# Patient Record
Sex: MALE | Race: WHITE | NOT HISPANIC OR LATINO | Employment: FULL TIME | URBAN - NONMETROPOLITAN AREA
[De-identification: names, ages, dates, MRNs, and addresses within clinical notes are randomized per-mention and may not be internally consistent; named-entity substitution may affect disease eponyms.]

---

## 2024-05-19 ENCOUNTER — HOSPITAL ENCOUNTER (EMERGENCY)
Facility: HOSPITAL | Age: 24
Discharge: HOME | End: 2024-05-19
Payer: COMMERCIAL

## 2024-05-19 ENCOUNTER — APPOINTMENT (OUTPATIENT)
Dept: RADIOLOGY | Facility: HOSPITAL | Age: 24
End: 2024-05-19
Payer: COMMERCIAL

## 2024-05-19 VITALS
TEMPERATURE: 99.1 F | HEART RATE: 72 BPM | SYSTOLIC BLOOD PRESSURE: 147 MMHG | OXYGEN SATURATION: 98 % | WEIGHT: 185 LBS | RESPIRATION RATE: 16 BRPM | BODY MASS INDEX: 24.52 KG/M2 | DIASTOLIC BLOOD PRESSURE: 90 MMHG | HEIGHT: 73 IN

## 2024-05-19 DIAGNOSIS — S82.891A CLOSED FRACTURE OF RIGHT ANKLE, INITIAL ENCOUNTER: Primary | ICD-10-CM

## 2024-05-19 PROCEDURE — 99284 EMERGENCY DEPT VISIT MOD MDM: CPT

## 2024-05-19 PROCEDURE — 73590 X-RAY EXAM OF LOWER LEG: CPT | Mod: RT

## 2024-05-19 PROCEDURE — 73610 X-RAY EXAM OF ANKLE: CPT | Mod: RIGHT SIDE | Performed by: RADIOLOGY

## 2024-05-19 PROCEDURE — 73610 X-RAY EXAM OF ANKLE: CPT | Mod: RT

## 2024-05-19 PROCEDURE — 73590 X-RAY EXAM OF LOWER LEG: CPT | Mod: RIGHT SIDE | Performed by: RADIOLOGY

## 2024-05-19 PROCEDURE — 2500000001 HC RX 250 WO HCPCS SELF ADMINISTERED DRUGS (ALT 637 FOR MEDICARE OP): Performed by: PHYSICIAN ASSISTANT

## 2024-05-19 RX ORDER — IBUPROFEN 600 MG/1
600 TABLET ORAL ONCE
Status: COMPLETED | OUTPATIENT
Start: 2024-05-19 | End: 2024-05-19

## 2024-05-19 RX ORDER — HYDROCODONE BITARTRATE AND ACETAMINOPHEN 5; 325 MG/1; MG/1
1 TABLET ORAL EVERY 6 HOURS PRN
Qty: 12 TABLET | Refills: 0 | Status: SHIPPED | OUTPATIENT
Start: 2024-05-19 | End: 2024-05-22

## 2024-05-19 RX ADMIN — IBUPROFEN 600 MG: 600 TABLET, FILM COATED ORAL at 10:47

## 2024-05-19 ASSESSMENT — PAIN - FUNCTIONAL ASSESSMENT
PAIN_FUNCTIONAL_ASSESSMENT: 0-10
PAIN_FUNCTIONAL_ASSESSMENT: 0-10

## 2024-05-19 ASSESSMENT — COLUMBIA-SUICIDE SEVERITY RATING SCALE - C-SSRS
6. HAVE YOU EVER DONE ANYTHING, STARTED TO DO ANYTHING, OR PREPARED TO DO ANYTHING TO END YOUR LIFE?: NO
1. IN THE PAST MONTH, HAVE YOU WISHED YOU WERE DEAD OR WISHED YOU COULD GO TO SLEEP AND NOT WAKE UP?: NO
2. HAVE YOU ACTUALLY HAD ANY THOUGHTS OF KILLING YOURSELF?: NO

## 2024-05-19 ASSESSMENT — PAIN DESCRIPTION - ORIENTATION
ORIENTATION: RIGHT
ORIENTATION: RIGHT

## 2024-05-19 ASSESSMENT — PAIN DESCRIPTION - DESCRIPTORS
DESCRIPTORS: TIGHTNESS
DESCRIPTORS: OTHER (COMMENT)

## 2024-05-19 ASSESSMENT — PAIN SCALES - GENERAL
PAINLEVEL_OUTOF10: 5 - MODERATE PAIN
PAINLEVEL_OUTOF10: 5 - MODERATE PAIN
PAINLEVEL_OUTOF10: 4
PAINLEVEL_OUTOF10: 5 - MODERATE PAIN

## 2024-05-19 ASSESSMENT — PAIN DESCRIPTION - FREQUENCY: FREQUENCY: CONSTANT/CONTINUOUS

## 2024-05-19 ASSESSMENT — PAIN DESCRIPTION - LOCATION
LOCATION: ANKLE
LOCATION: LEG

## 2024-05-19 ASSESSMENT — PAIN DESCRIPTION - ONSET: ONSET: SUDDEN

## 2024-05-19 ASSESSMENT — PAIN DESCRIPTION - PAIN TYPE: TYPE: ACUTE PAIN

## 2024-05-19 NOTE — ED PROVIDER NOTES
HPI   Chief Complaint   Patient presents with    Leg Pain     Pt comes in for leg pain x yesterday. Pt states that he was in a golf cart yesterday and took a turn really hard causing him to fall out of the cart. Pt reports his entire right leg in pain. Swelling and bruising noted to the right ankle. PMS intact       Patient presents with right ankle pain after injury yesterday.  Patient states he is not exactly sure the mechanism of injury.  States he was in a golf cart that turned sharply and he fell out.  He was unable to continue golfing.  He has not been able to put any or minimal weight on the leg.  He has noticed significant swelling and ecchymosis.  Denies any loss of sensation or weakness.  Pain is worse with attempted movement or ambulation.  He does get some relief at rest.  Patient has been using ice packs with some improvement.  Patient denies any other injury from his fall.  Denies any head injury or loss conscious.  Denies any upper extremity injuries.  Patient states he does have an abrasion to the knee but he is having no trouble moving it.  Denies any pain with movement.      History provided by:  Patient                      [place] Coma Scale Score: 15                     Patient History   No past medical history on file.  Past Surgical History:   Procedure Laterality Date    OTHER SURGICAL HISTORY  12/07/2021    Tonsillectomy with adenoidectomy    OTHER SURGICAL HISTORY  12/07/2021    Achilles tendon surgery    OTHER SURGICAL HISTORY  12/07/2021    Foot surgery     No family history on file.  Social History     Tobacco Use    Smoking status: Not on file    Smokeless tobacco: Not on file   Substance Use Topics    Alcohol use: Not on file    Drug use: Not on file       Physical Exam   ED Triage Vitals [05/19/24 0947]   Temperature Heart Rate Respirations BP   37.3 °C (99.1 °F) 88 16 128/86      Pulse Ox Temp Source Heart Rate Source Patient Position   98 % Temporal Monitor Sitting      BP Location  FiO2 (%)     Right arm --       Physical Exam  Vitals and nursing note reviewed.   Constitutional:       General: He is not in acute distress.     Appearance: Normal appearance. He is normal weight. He is not ill-appearing or toxic-appearing.   HENT:      Head: Normocephalic and atraumatic.      Right Ear: External ear normal.      Left Ear: External ear normal.      Nose: Nose normal.      Mouth/Throat:      Mouth: Mucous membranes are moist.   Eyes:      General: No scleral icterus.     Conjunctiva/sclera: Conjunctivae normal.   Musculoskeletal:      Right hip: Normal.      Right knee: Normal.      Right ankle: Swelling and ecchymosis present. No deformity. Tenderness present over the lateral malleolus, medial malleolus and proximal fibula. No base of 5th metatarsal tenderness. Decreased range of motion. Normal pulse.      Right Achilles Tendon: No tenderness or defects. Chacon's test negative.        Legs:    Skin:     General: Skin is warm.      Capillary Refill: Capillary refill takes less than 2 seconds.      Findings: Bruising present. No rash.   Neurological:      General: No focal deficit present.      Mental Status: He is alert and oriented to person, place, and time.      Cranial Nerves: No cranial nerve deficit or facial asymmetry.      Sensory: No sensory deficit.      Motor: No weakness.   Psychiatric:         Attention and Perception: Attention and perception normal.         Mood and Affect: Mood and affect normal.         Speech: Speech normal.         Behavior: Behavior normal. Behavior is cooperative.         Thought Content: Thought content normal.         Cognition and Memory: Cognition and memory normal.         Judgment: Judgment normal.         ED Course & MDM   Diagnoses as of 05/19/24 1114   Closed fracture of right ankle, initial encounter       Medical Decision Making  Patient presents with right ankle pain after injury yesterday.  Patient states he is not exactly sure the mechanism of  injury.  States he was in a golf cart that turned sharply and he fell out.  He was unable to continue golfing.  He has not been able to put any or minimal weight on the leg.  He has noticed significant swelling and ecchymosis.  Denies any loss of sensation or weakness.  Pain is worse with attempted movement or ambulation.  He does get some relief at rest.  Patient has been using ice packs with some improvement.  Patient denies any other injury from his fall.  Denies any head injury or loss conscious.  Denies any upper extremity injuries.  Patient states he does have an abrasion to the knee but he is having no trouble moving it.  Denies any pain with movement.    Ddx: Fracture, contusion, strain, sprain, other    Will obtain x-rays.  Ibuprofen given for pain per patient request.  X-ray shows posterior malleolar fracture.  There is also widening ankle mortise.  Concerning for delta ligamentous injury  Patient placed in walking boot with crutches  Patient given strict instructions not to put any weight on the ankle or foot until further directed.  He is given numerous follow-up locally to give him opportunity to be seen soon as possible to discuss care and possible surgical intervention.  Patient is given podiatry as well as to local orthopedic specialist.  Patient encouraged to continue Tylenol Motrin for pain.  He is given a short course of Norco for additional pain relief if needed.  Patient is encouraged to continue ice elevate and rest.  Patient discharged home in improved and stable condition    Amount and/or Complexity of Data Reviewed  Radiology: ordered and independent interpretation performed. Decision-making details documented in ED Course.     Details: Fracture noted    Risk  Risk Details: None        Procedure  Procedures     Alexa Romero PA-C  05/19/24 4712

## 2024-05-19 NOTE — Clinical Note
Manuel Al was seen and treated in our emergency department on 5/19/2024.  He may return to work on 05/22/2024.  Patient may return sooner if improved.  Patient will be unable to use the right leg.  Please accommodate.     If you have any questions or concerns, please don't hesitate to call.      Alexa Romero PA-C

## 2024-05-19 NOTE — DISCHARGE INSTRUCTIONS
Follow-up with any other specialist listed above as soon as you can appointment.  You can continue Tylenol and Motrin for pain relief.  Pain medication as needed.  Continue to ice elevate and rest.  Do not put any weight on your ankle.  Use your crutches.  Stay in the boot until further directed.  You may remove the boot to wash your leg.

## 2024-05-20 ENCOUNTER — PREP FOR PROCEDURE (OUTPATIENT)
Dept: PODIATRY | Facility: HOSPITAL | Age: 24
End: 2024-05-20

## 2024-05-20 ENCOUNTER — LAB (OUTPATIENT)
Dept: LAB | Facility: LAB | Age: 24
End: 2024-05-20
Payer: COMMERCIAL

## 2024-05-20 DIAGNOSIS — S82.871A CLOSED DISPLACED PILON FRACTURE OF RIGHT TIBIA, INITIAL ENCOUNTER: Primary | ICD-10-CM

## 2024-05-20 DIAGNOSIS — S92.301A FRACTURE OF UNSPECIFIED METATARSAL BONE(S), RIGHT FOOT, INITIAL ENCOUNTER FOR CLOSED FRACTURE: Primary | ICD-10-CM

## 2024-05-20 LAB
ALBUMIN SERPL BCP-MCNC: 4.6 G/DL (ref 3.4–5)
ALP SERPL-CCNC: 90 U/L (ref 33–120)
ALT SERPL W P-5'-P-CCNC: 12 U/L (ref 10–52)
ANION GAP SERPL CALC-SCNC: 11 MMOL/L (ref 10–20)
AST SERPL W P-5'-P-CCNC: 19 U/L (ref 9–39)
BASOPHILS # BLD AUTO: 0.06 X10*3/UL (ref 0–0.1)
BASOPHILS NFR BLD AUTO: 0.6 %
BILIRUB SERPL-MCNC: 0.6 MG/DL (ref 0–1.2)
BUN SERPL-MCNC: 13 MG/DL (ref 6–23)
CALCIUM SERPL-MCNC: 9.5 MG/DL (ref 8.6–10.3)
CHLORIDE SERPL-SCNC: 103 MMOL/L (ref 98–107)
CO2 SERPL-SCNC: 29 MMOL/L (ref 21–32)
CREAT SERPL-MCNC: 1.05 MG/DL (ref 0.5–1.3)
EGFRCR SERPLBLD CKD-EPI 2021: >90 ML/MIN/1.73M*2
EOSINOPHIL # BLD AUTO: 0.12 X10*3/UL (ref 0–0.7)
EOSINOPHIL NFR BLD AUTO: 1.2 %
ERYTHROCYTE [DISTWIDTH] IN BLOOD BY AUTOMATED COUNT: 13.2 % (ref 11.5–14.5)
GLUCOSE SERPL-MCNC: 87 MG/DL (ref 74–99)
HCT VFR BLD AUTO: 49.6 % (ref 41–52)
HGB BLD-MCNC: 15.9 G/DL (ref 13.5–17.5)
IMM GRANULOCYTES # BLD AUTO: 0.06 X10*3/UL (ref 0–0.7)
IMM GRANULOCYTES NFR BLD AUTO: 0.6 % (ref 0–0.9)
LYMPHOCYTES # BLD AUTO: 2.33 X10*3/UL (ref 1.2–4.8)
LYMPHOCYTES NFR BLD AUTO: 23.4 %
MCH RBC QN AUTO: 30.8 PG (ref 26–34)
MCHC RBC AUTO-ENTMCNC: 32.1 G/DL (ref 32–36)
MCV RBC AUTO: 96 FL (ref 80–100)
MONOCYTES # BLD AUTO: 1.1 X10*3/UL (ref 0.1–1)
MONOCYTES NFR BLD AUTO: 11.1 %
NEUTROPHILS # BLD AUTO: 6.27 X10*3/UL (ref 1.2–7.7)
NEUTROPHILS NFR BLD AUTO: 63.1 %
NRBC BLD-RTO: 0 /100 WBCS (ref 0–0)
PLATELET # BLD AUTO: 196 X10*3/UL (ref 150–450)
POTASSIUM SERPL-SCNC: 3.9 MMOL/L (ref 3.5–5.3)
PROT SERPL-MCNC: 6.9 G/DL (ref 6.4–8.2)
RBC # BLD AUTO: 5.16 X10*6/UL (ref 4.5–5.9)
SODIUM SERPL-SCNC: 139 MMOL/L (ref 136–145)
WBC # BLD AUTO: 9.9 X10*3/UL (ref 4.4–11.3)

## 2024-05-20 PROCEDURE — 36415 COLL VENOUS BLD VENIPUNCTURE: CPT

## 2024-05-20 PROCEDURE — 85025 COMPLETE CBC W/AUTO DIFF WBC: CPT

## 2024-05-20 PROCEDURE — 80053 COMPREHEN METABOLIC PANEL: CPT

## 2024-05-23 ENCOUNTER — OFFICE VISIT (OUTPATIENT)
Dept: PRIMARY CARE | Facility: CLINIC | Age: 24
End: 2024-05-23
Payer: COMMERCIAL

## 2024-05-23 VITALS
WEIGHT: 185 LBS | DIASTOLIC BLOOD PRESSURE: 70 MMHG | HEIGHT: 73 IN | BODY MASS INDEX: 24.52 KG/M2 | HEART RATE: 82 BPM | OXYGEN SATURATION: 98 % | SYSTOLIC BLOOD PRESSURE: 122 MMHG

## 2024-05-23 DIAGNOSIS — Z01.818 PRE-OP EVALUATION: ICD-10-CM

## 2024-05-23 PROBLEM — D48.5 NEOPLASM OF UNCERTAIN BEHAVIOR OF SKIN: Status: ACTIVE | Noted: 2024-05-23

## 2024-05-23 PROBLEM — F41.1 GENERALIZED ANXIETY DISORDER: Status: ACTIVE | Noted: 2024-05-23

## 2024-05-23 PROCEDURE — 1036F TOBACCO NON-USER: CPT | Performed by: NURSE PRACTITIONER

## 2024-05-23 PROCEDURE — 99213 OFFICE O/P EST LOW 20 MIN: CPT | Performed by: NURSE PRACTITIONER

## 2024-05-23 PROCEDURE — 93000 ELECTROCARDIOGRAM COMPLETE: CPT | Performed by: NURSE PRACTITIONER

## 2024-05-23 ASSESSMENT — PATIENT HEALTH QUESTIONNAIRE - PHQ9
SUM OF ALL RESPONSES TO PHQ9 QUESTIONS 1 AND 2: 0
2. FEELING DOWN, DEPRESSED OR HOPELESS: NOT AT ALL
1. LITTLE INTEREST OR PLEASURE IN DOING THINGS: NOT AT ALL

## 2024-05-23 ASSESSMENT — ENCOUNTER SYMPTOMS
HEADACHES: 0
COUGH: 0
DIZZINESS: 0
SHORTNESS OF BREATH: 0

## 2024-05-23 NOTE — H&P (VIEW-ONLY)
"Subjective   Patient ID: Manuel Al is a 23 y.o. male who presents for Pre-op Exam (Foot fracture, surgery set for  ).    Manuel comes to the office, or preoperative clearance.  Is scheduled for an ORIF of the right ankle on May 30th under general/regional anesthesia.  He denies dizziness, chest pain, headache, or syncopal episodes.  IO EK bpm, normal sinus rhythm with sinus arrhythmia.  Labs completed through podiatrist and reviewed today  Medications: Taking Vicodin for pain.  Is not taking any multivitamins/herbs.  Instructed to avoid anti-inflammatories 7 days prior to scheduled surgery.  Patient endorses smoking (vaping) and marijuana use.  Patient has been encouraged to cut back and try to quit smoking and using marijuana prior to surgery as this may cause delayed wound healing.  No loose teeth or dental work in progress, prior tonsillectomy.  No snoring.  Has had general anesthesia in the past and tolerated without incident.  No family history of anesthesia complications or MH.  I, Debby Beltre, RUBEN-CNP, have examined this patient, checked all the appropriate lab work and tests and certify that, to the best of my knowledge, that there is no medical contraindication for undergoing surgery.            Review of Systems   Respiratory:  Negative for cough and shortness of breath.    Cardiovascular:  Negative for chest pain.   Musculoskeletal:         Right lower leg in boot   Neurological:  Negative for dizziness, syncope and headaches.       Objective   /70   Pulse 82   Ht 1.854 m (6' 1\")   Wt 83.9 kg (185 lb)   SpO2 98%   BMI 24.41 kg/m²     Physical Exam  Vitals and nursing note reviewed.   Constitutional:       Appearance: Normal appearance.   HENT:      Head: Normocephalic.      Mouth/Throat:      Pharynx: No posterior oropharyngeal erythema.      Comments: Both tonsils absent  Neck:      Thyroid: No thyromegaly.      Vascular: No carotid bruit.   Cardiovascular:      Rate and " Rhythm: Normal rate and regular rhythm.      Heart sounds: Normal heart sounds.   Pulmonary:      Effort: Pulmonary effort is normal.      Breath sounds: Normal breath sounds.   Musculoskeletal:      Cervical back: Normal range of motion.   Lymphadenopathy:      Cervical: No cervical adenopathy.   Skin:     General: Skin is warm and dry.   Neurological:      General: No focal deficit present.      Mental Status: He is alert and oriented to person, place, and time.   Psychiatric:         Mood and Affect: Mood normal.         Thought Content: Thought content normal.         Assessment/Plan   Problem List Items Addressed This Visit             ICD-10-CM    Pre-op evaluation Z01.818     1. Pre-op evaluation      IO EKG: normal

## 2024-05-24 NOTE — PREPROCEDURE INSTRUCTIONS
No outpatient medications have been marked as taking for the 5/30/24 encounter (Hospital Encounter).                       NPO Instructions:    Nothing to eat or drink after midnight    Additional Instructions:     Will need  home, will receive call day before surgery with arrival time

## 2024-05-29 ENCOUNTER — ANESTHESIA EVENT (OUTPATIENT)
Dept: OPERATING ROOM | Facility: HOSPITAL | Age: 24
End: 2024-05-29
Payer: COMMERCIAL

## 2024-05-29 ENCOUNTER — PREP FOR PROCEDURE (OUTPATIENT)
Dept: PODIATRY | Facility: HOSPITAL | Age: 24
End: 2024-05-29
Payer: COMMERCIAL

## 2024-05-29 DIAGNOSIS — S82.391A OTHER CLOSED FRACTURE OF DISTAL END OF RIGHT TIBIA, INITIAL ENCOUNTER: Primary | ICD-10-CM

## 2024-05-29 RX ORDER — SODIUM CHLORIDE, SODIUM LACTATE, POTASSIUM CHLORIDE, CALCIUM CHLORIDE 600; 310; 30; 20 MG/100ML; MG/100ML; MG/100ML; MG/100ML
75 INJECTION, SOLUTION INTRAVENOUS CONTINUOUS
Status: CANCELLED | OUTPATIENT
Start: 2024-05-30

## 2024-05-30 ENCOUNTER — APPOINTMENT (OUTPATIENT)
Dept: RADIOLOGY | Facility: HOSPITAL | Age: 24
End: 2024-05-30
Payer: COMMERCIAL

## 2024-05-30 ENCOUNTER — HOSPITAL ENCOUNTER (OUTPATIENT)
Facility: HOSPITAL | Age: 24
Setting detail: OUTPATIENT SURGERY
Discharge: HOME | End: 2024-05-30
Attending: PODIATRIST | Admitting: PODIATRIST
Payer: COMMERCIAL

## 2024-05-30 ENCOUNTER — ANESTHESIA (OUTPATIENT)
Dept: OPERATING ROOM | Facility: HOSPITAL | Age: 24
End: 2024-05-30
Payer: COMMERCIAL

## 2024-05-30 VITALS
SYSTOLIC BLOOD PRESSURE: 139 MMHG | DIASTOLIC BLOOD PRESSURE: 81 MMHG | TEMPERATURE: 97.4 F | WEIGHT: 186.73 LBS | HEART RATE: 56 BPM | OXYGEN SATURATION: 99 % | HEIGHT: 73 IN | BODY MASS INDEX: 24.75 KG/M2 | RESPIRATION RATE: 12 BRPM

## 2024-05-30 DIAGNOSIS — S82.391A OTHER CLOSED FRACTURE OF DISTAL END OF RIGHT TIBIA, INITIAL ENCOUNTER: ICD-10-CM

## 2024-05-30 PROCEDURE — C1713 ANCHOR/SCREW BN/BN,TIS/BN: HCPCS | Performed by: PODIATRIST

## 2024-05-30 PROCEDURE — 7100000002 HC RECOVERY ROOM TIME - EACH INCREMENTAL 1 MINUTE: Performed by: PODIATRIST

## 2024-05-30 PROCEDURE — 7100000001 HC RECOVERY ROOM TIME - INITIAL BASE CHARGE: Performed by: PODIATRIST

## 2024-05-30 PROCEDURE — 2500000005 HC RX 250 GENERAL PHARMACY W/O HCPCS: Performed by: ANESTHESIOLOGY

## 2024-05-30 PROCEDURE — 3700000001 HC GENERAL ANESTHESIA TIME - INITIAL BASE CHARGE: Performed by: PODIATRIST

## 2024-05-30 PROCEDURE — 2780000003 HC OR 278 NO HCPCS: Performed by: PODIATRIST

## 2024-05-30 PROCEDURE — 3600000004 HC OR TIME - INITIAL BASE CHARGE - PROCEDURE LEVEL FOUR: Performed by: PODIATRIST

## 2024-05-30 PROCEDURE — 3700000002 HC GENERAL ANESTHESIA TIME - EACH INCREMENTAL 1 MINUTE: Performed by: PODIATRIST

## 2024-05-30 PROCEDURE — 2500000004 HC RX 250 GENERAL PHARMACY W/ HCPCS (ALT 636 FOR OP/ED): Mod: JZ | Performed by: PODIATRIST

## 2024-05-30 PROCEDURE — 2500000004 HC RX 250 GENERAL PHARMACY W/ HCPCS (ALT 636 FOR OP/ED): Performed by: ANESTHESIOLOGY

## 2024-05-30 PROCEDURE — 2500000001 HC RX 250 WO HCPCS SELF ADMINISTERED DRUGS (ALT 637 FOR MEDICARE OP): Performed by: ANESTHESIOLOGY

## 2024-05-30 PROCEDURE — 7100000010 HC PHASE TWO TIME - EACH INCREMENTAL 1 MINUTE: Performed by: PODIATRIST

## 2024-05-30 PROCEDURE — 2720000007 HC OR 272 NO HCPCS: Performed by: PODIATRIST

## 2024-05-30 PROCEDURE — 7100000009 HC PHASE TWO TIME - INITIAL BASE CHARGE: Performed by: PODIATRIST

## 2024-05-30 PROCEDURE — 3600000009 HC OR TIME - EACH INCREMENTAL 1 MINUTE - PROCEDURE LEVEL FOUR: Performed by: PODIATRIST

## 2024-05-30 DEVICE — IMPLANTABLE DEVICE: Type: IMPLANTABLE DEVICE | Site: ANKLE | Status: NON-FUNCTIONAL

## 2024-05-30 DEVICE — IMPLANTABLE DEVICE: Type: IMPLANTABLE DEVICE | Site: ANKLE | Status: FUNCTIONAL

## 2024-05-30 RX ORDER — ONDANSETRON HYDROCHLORIDE 2 MG/ML
4 INJECTION, SOLUTION INTRAVENOUS ONCE
Status: COMPLETED | OUTPATIENT
Start: 2024-05-30 | End: 2024-05-30

## 2024-05-30 RX ORDER — SODIUM CHLORIDE, SODIUM LACTATE, POTASSIUM CHLORIDE, CALCIUM CHLORIDE 600; 310; 30; 20 MG/100ML; MG/100ML; MG/100ML; MG/100ML
75 INJECTION, SOLUTION INTRAVENOUS CONTINUOUS
Status: DISCONTINUED | OUTPATIENT
Start: 2024-05-30 | End: 2024-05-30 | Stop reason: HOSPADM

## 2024-05-30 RX ORDER — PROPOFOL 10 MG/ML
INJECTION, EMULSION INTRAVENOUS AS NEEDED
Status: DISCONTINUED | OUTPATIENT
Start: 2024-05-30 | End: 2024-05-30

## 2024-05-30 RX ORDER — ONDANSETRON HYDROCHLORIDE 2 MG/ML
4 INJECTION, SOLUTION INTRAVENOUS ONCE AS NEEDED
Status: DISCONTINUED | OUTPATIENT
Start: 2024-05-30 | End: 2024-05-30 | Stop reason: HOSPADM

## 2024-05-30 RX ORDER — ACETAMINOPHEN 325 MG/1
975 TABLET ORAL ONCE
Status: COMPLETED | OUTPATIENT
Start: 2024-05-30 | End: 2024-05-30

## 2024-05-30 RX ORDER — CEFAZOLIN SODIUM 2 G/100ML
2 INJECTION, SOLUTION INTRAVENOUS
Status: COMPLETED | OUTPATIENT
Start: 2024-05-30 | End: 2024-05-30

## 2024-05-30 RX ORDER — FAMOTIDINE 10 MG/ML
20 INJECTION INTRAVENOUS ONCE
Status: COMPLETED | OUTPATIENT
Start: 2024-05-30 | End: 2024-05-30

## 2024-05-30 RX ORDER — MIDAZOLAM HYDROCHLORIDE 1 MG/ML
2 INJECTION INTRAMUSCULAR; INTRAVENOUS ONCE
Status: COMPLETED | OUTPATIENT
Start: 2024-05-30 | End: 2024-05-30

## 2024-05-30 RX ORDER — HYDROCODONE BITARTRATE AND ACETAMINOPHEN 5; 325 MG/1; MG/1
TABLET ORAL
COMMUNITY

## 2024-05-30 RX ORDER — GLYCOPYRROLATE 0.2 MG/ML
INJECTION INTRAMUSCULAR; INTRAVENOUS AS NEEDED
Status: DISCONTINUED | OUTPATIENT
Start: 2024-05-30 | End: 2024-05-30

## 2024-05-30 RX ORDER — MIDAZOLAM HYDROCHLORIDE 1 MG/ML
1 INJECTION INTRAMUSCULAR; INTRAVENOUS ONCE
Status: COMPLETED | OUTPATIENT
Start: 2024-05-30 | End: 2024-05-30

## 2024-05-30 RX ORDER — SODIUM CHLORIDE, SODIUM LACTATE, POTASSIUM CHLORIDE, CALCIUM CHLORIDE 600; 310; 30; 20 MG/100ML; MG/100ML; MG/100ML; MG/100ML
100 INJECTION, SOLUTION INTRAVENOUS CONTINUOUS
Status: DISCONTINUED | OUTPATIENT
Start: 2024-05-30 | End: 2024-05-30 | Stop reason: HOSPADM

## 2024-05-30 RX ORDER — ROCURONIUM BROMIDE 10 MG/ML
INJECTION, SOLUTION INTRAVENOUS AS NEEDED
Status: DISCONTINUED | OUTPATIENT
Start: 2024-05-30 | End: 2024-05-30

## 2024-05-30 RX ORDER — FENTANYL CITRATE 50 UG/ML
INJECTION, SOLUTION INTRAMUSCULAR; INTRAVENOUS AS NEEDED
Status: DISCONTINUED | OUTPATIENT
Start: 2024-05-30 | End: 2024-05-30

## 2024-05-30 RX ORDER — GABAPENTIN 300 MG/1
300 CAPSULE ORAL ONCE
Status: COMPLETED | OUTPATIENT
Start: 2024-05-30 | End: 2024-05-30

## 2024-05-30 RX ORDER — KETOROLAC TROMETHAMINE 30 MG/ML
INJECTION, SOLUTION INTRAMUSCULAR; INTRAVENOUS AS NEEDED
Status: DISCONTINUED | OUTPATIENT
Start: 2024-05-30 | End: 2024-05-30

## 2024-05-30 RX ORDER — MIDAZOLAM HYDROCHLORIDE 1 MG/ML
INJECTION INTRAMUSCULAR; INTRAVENOUS AS NEEDED
Status: DISCONTINUED | OUTPATIENT
Start: 2024-05-30 | End: 2024-05-30

## 2024-05-30 RX ORDER — OXYCODONE HYDROCHLORIDE 5 MG/1
5 TABLET ORAL EVERY 4 HOURS PRN
Status: DISCONTINUED | OUTPATIENT
Start: 2024-05-30 | End: 2024-05-30 | Stop reason: HOSPADM

## 2024-05-30 RX ORDER — NEOSTIGMINE METHYLSULFATE 1 MG/ML
INJECTION, SOLUTION INTRAVENOUS AS NEEDED
Status: DISCONTINUED | OUTPATIENT
Start: 2024-05-30 | End: 2024-05-30

## 2024-05-30 RX ORDER — BUPIVACAINE HYDROCHLORIDE 2.5 MG/ML
INJECTION, SOLUTION EPIDURAL; INFILTRATION; INTRACAUDAL AS NEEDED
Status: DISCONTINUED | OUTPATIENT
Start: 2024-05-30 | End: 2024-05-30

## 2024-05-30 RX ADMIN — BUPIVACAINE HYDROCHLORIDE 45 ML: 2.5 INJECTION, SOLUTION EPIDURAL; INFILTRATION; INTRACAUDAL; PERINEURAL at 07:16

## 2024-05-30 RX ADMIN — FAMOTIDINE 20 MG: 10 INJECTION, SOLUTION INTRAVENOUS at 06:41

## 2024-05-30 RX ADMIN — CEFAZOLIN SODIUM 2 G: 2 INJECTION, SOLUTION INTRAVENOUS at 07:26

## 2024-05-30 RX ADMIN — GABAPENTIN 300 MG: 300 CAPSULE ORAL at 06:41

## 2024-05-30 RX ADMIN — MIDAZOLAM HYDROCHLORIDE 2 MG: 1 INJECTION, SOLUTION INTRAMUSCULAR; INTRAVENOUS at 07:07

## 2024-05-30 RX ADMIN — NEOSTIGMINE METHYLSULFATE 3 MG: 1 INJECTION, SOLUTION INTRAVENOUS at 08:55

## 2024-05-30 RX ADMIN — Medication 30 MG: at 07:31

## 2024-05-30 RX ADMIN — DEXAMETHASONE SODIUM PHOSPHATE 8 MG: 4 INJECTION, SOLUTION INTRAMUSCULAR; INTRAVENOUS at 07:50

## 2024-05-30 RX ADMIN — SODIUM CHLORIDE, POTASSIUM CHLORIDE, SODIUM LACTATE AND CALCIUM CHLORIDE 75 ML/HR: 600; 310; 30; 20 INJECTION, SOLUTION INTRAVENOUS at 06:41

## 2024-05-30 RX ADMIN — ACETAMINOPHEN 975 MG: 325 TABLET ORAL at 06:41

## 2024-05-30 RX ADMIN — ONDANSETRON 4 MG: 2 INJECTION INTRAMUSCULAR; INTRAVENOUS at 06:41

## 2024-05-30 RX ADMIN — MIDAZOLAM HYDROCHLORIDE 1 MG: 1 INJECTION, SOLUTION INTRAMUSCULAR; INTRAVENOUS at 07:20

## 2024-05-30 RX ADMIN — MIDAZOLAM HYDROCHLORIDE 2 MG: 1 INJECTION, SOLUTION INTRAMUSCULAR; INTRAVENOUS at 07:16

## 2024-05-30 RX ADMIN — PROPOFOL 200 MG: 10 INJECTION, EMULSION INTRAVENOUS at 07:31

## 2024-05-30 RX ADMIN — FENTANYL CITRATE 100 MCG: 50 INJECTION, SOLUTION INTRAMUSCULAR; INTRAVENOUS at 07:16

## 2024-05-30 RX ADMIN — KETOROLAC TROMETHAMINE 30 MG: 30 INJECTION, SOLUTION INTRAMUSCULAR at 08:45

## 2024-05-30 RX ADMIN — ROCURONIUM BROMIDE 40 MG: 10 INJECTION, SOLUTION INTRAVENOUS at 07:31

## 2024-05-30 RX ADMIN — GLYCOPYRROLATE 0.6 MG: 0.2 INJECTION, SOLUTION INTRAMUSCULAR; INTRAVENOUS at 08:55

## 2024-05-30 ASSESSMENT — PAIN SCALES - GENERAL
PAINLEVEL_OUTOF10: 0 - NO PAIN
PAIN_LEVEL: 1
PAINLEVEL_OUTOF10: 0 - NO PAIN

## 2024-05-30 ASSESSMENT — COLUMBIA-SUICIDE SEVERITY RATING SCALE - C-SSRS
2. HAVE YOU ACTUALLY HAD ANY THOUGHTS OF KILLING YOURSELF?: NO
1. IN THE PAST MONTH, HAVE YOU WISHED YOU WERE DEAD OR WISHED YOU COULD GO TO SLEEP AND NOT WAKE UP?: NO
6. HAVE YOU EVER DONE ANYTHING, STARTED TO DO ANYTHING, OR PREPARED TO DO ANYTHING TO END YOUR LIFE?: NO

## 2024-05-30 ASSESSMENT — PAIN - FUNCTIONAL ASSESSMENT
PAIN_FUNCTIONAL_ASSESSMENT: 0-10

## 2024-05-30 NOTE — ANESTHESIA PROCEDURE NOTES
Airway  Date/Time: 5/30/2024 7:31 AM  Urgency: elective      Staffing  Performed: attending   Authorized by: Benny Yan MD    Performed by: Benny Yan MD  Patient location during procedure: OR    Indications and Patient Condition  Preoxygenated: yes      Final Airway Details  Final airway type: endotracheal airway      Successful airway: ETT  Cuffed: yes   Successful intubation technique: video laryngoscopy  ETT size (mm): 8.0  Cormack-Lehane Classification: grade I - full view of glottis  Placement verified by: capnometry   Number of attempts at approach: 1

## 2024-05-30 NOTE — DISCHARGE INSTRUCTIONS
"Remain non weightbearing surgical limb.  Keep dressing and splint clean, dry, intact until follow up visit at [facility] next week.   Apply ice to back of knee for 15 min each hour if needed for pain control. Do not apply ice pack directly to foot or ankle.   Take pain medication as prescribed in a safe manner only if needed.   Elevate surgical limb.   Call if you have questions or concerns; [phone].    If appropriate alternate acetaminophen/Tylenol with ibuprofen every 3 hours to help with pain control. For example: if you take Tylenol at 12:00 follow with ibuprofen at 3:00, Tylenol again at 6:00 and ibuprofen at 9:00 an so forth.    If unable to take ibuprofen, Tylenol can be taken every 4 hours for pain control.      Surgery to fix a broken bone - Discharge instructions    The Basics  Written by the doctors and editors at [facility]  What are discharge instructions? -- Discharge instructions are information about how to take care of yourself after getting medical care for a health problem.  How are broken bones fixed with surgery? -- This can be done with something called \"open reduction and internal fixation\" surgery. For this surgery, the doctor moves the broken pieces of bone into the correct place. This is called \"reduction.\" They also use some type of hardware to hold the bone in place while it heals. This is called \"internal fixation.\" The hardware can include metal plates, rods or nails, pins, wires, or screws (figure 1).  Usually, the hardware used to hold the bone in place goes inside of the body. It can stay in place after the bone heals or, in some cases, the doctor will remove it. For some kinds of broken bones, the doctor uses pins or screws that go through the skin and into the bone. They are attached to a metal bar that is outside of the body. This is called an \"external fixator.\" It is removed when the bone is healed.  How long it takes for you to recover, and what you need to do, " "depends on what kind of fracture you had. Most fractures take weeks to months to heal. Fractures in children usually heal faster than fractures in adults.  How do I care for myself at home? -- Ask the doctor or nurse what you should do when you go home. Make sure that you understand exactly what you need to do to care for yourself. Ask questions if there is anything you do not understand.  For the first 24 hours after surgery:  ? Do not drive or operate heavy or dangerous machinery.  ? Do not make any important decisions or sign any important papers.  ? Do not drink alcohol of any kind.  You should also:  ? Prop your injured body part on pillows, keeping it above the level of your heart. This can help lessen pain and swelling.  ? Take medicine like ibuprofen (sample brand names: Advil, Motrin) or naproxen (sample brand names: Aleve, Naprosyn) for swelling and pain, if needed. These are nonsteroidal antiinflammatory drugs (\"NSAIDS\"). You might also have gotten a prescription for stronger pain medicines to take for a short time. If so, follow the instructions for taking them.  ? Take a stool softener to help with constipation, if needed. Opioid pain medicines can cause constipation.  ? Ice to help ease pain and swelling - Place an ice pack or a bag of frozen vegetables wrapped in a towel over the painful part. Never put ice right on the skin. Do not leave the ice on for more than 10 to 15 minutes at a time. Use for the first 24 to 48 hours after an injury.  ? If you smoke, try to quit. Broken bones take longer to heal if you smoke.  ? Take care of your incision - You might have stitches, skin staples, surgical glue, or a special skin tape on your incision. If you had laparoscopic surgery, you might have more than 1 incision.  ? If you do not have a cast or splint over your incision:  ? Keep your incision dry and covered with a bandage for the first 1 to 2 days after surgery. Your doctor or nurse will tell you exactly " how long to keep your incision dry.  ? Once you no longer need to keep your incision dry, gently wash it with soap and water whenever you take a shower. Do not put your incision underwater, such as in a bath, pool, or lake. This can slow healing and raise your chance of getting an infection.  ? After you wash your incision, pat it dry. Your doctor or nurse will tell you if you need to put an antibiotic ointment on the incision. They will also tell you if you need to cover your incision with a bandage or gauze.  ? Always wash your hands before and after you touch your incision or bandage.  ? If you have a cast or splint that covers your incision:  ? Keep your cast or splint dry. The doctor will tell you when this needs to be changed.  ? If you have an external fixator:  ? The staff will teach you how to take care of the pins in your skin.  ? Increase your activity slowly - Start with short walks around your home, and walk a little more each day.  ? Use your sling, crutches, walker, or wheelchair as instructed. If your surgery was on your pelvis, leg, ankle, or foot, you might not be able to put weight on it as you walk.  ? Avoid heavy lifting, sports, and swimming until for at least a week or 2. (Your doctor or nurse will tell you exactly how long to avoid these or other activities.)  ? Your doctor might refer you to a physical therapist (exercise expert). If you were given specific exercises, be sure to do them. This will help build strength once your bone has healed.  What follow-up care do I need? -- The doctor will want to see you again after surgery to check on your progress. Go to these appointments.  If you have stitches or staples, you will need to have them taken out. Your doctor will usually want to do this in 1 to 2 weeks. Some stitches absorb on their own and do not need to be removed. If the doctor used skin glue or tape, it will fall off on its own. Do not pick at it or try to remove it yourself.  If  you have a cast or splint, the doctor will want to remove or change it at a certain time. Do not try to remove it yourself.  When should I call the doctor? -- Call for emergency help right away (in the [place] and [place], call 9-1-1) if:  ? You start to have trouble breathing.  ? You are becoming pale and very weak or pass out.  Call for advice if:  ? You have a fever of 100.4°F (38°C) or higher, or chills.  ? There is a bad smell or drainage coming from your cast, splint, or incision.  ? You have worsening redness or swelling around the cuts from your surgery.  ? You have severe pain that is not relieved by pain medicine.  ? Your cast or splint becomes too tight and uncomfortable.  ? Your fingers or toes are blue, gray, or cold.  ? Your cast or splint feels too loose, or your cast has a crack or becomes soft.  ? You are not able to move your fingers or toes.  ? The skin becomes red or irritated around the cast.  All topics are updated as new evidence becomes available and our peer review process is complete.  This topic retrieved from [employer] on: Mar 13, 2024.  Topic 741997 Version 2.0  Release: 32.2.4 - C32.71  © 2024 UpToDate, Inc. and/or its affiliates. All rights reserved.  figure 1: Open reduction and internal fixation    Consumer Information Use and Disclaimer  Disclaimer: This generalized information is a limited summary of diagnosis, treatment, and/or medication information. It is not meant to be comprehensive and should be used as a tool to help the user understand and/or assess potential diagnostic and treatment options. It does NOT include all information about conditions, treatments, medications, side effects, or risks that may apply to a specific patient. It is not intended to be medical advice or a substitute for the medical advice, diagnosis, or treatment of a health care provider based on the health care provider's examination and assessment of a patient's specific and unique circumstances. Patients  must speak with a health care provider for complete information about their health, medical questions, and treatment options, including any risks or benefits regarding use of medications. This information does not endorse any treatments or medications as safe, effective, or approved for treating a specific patient. UpToDate, Inc. and its affiliates disclaim any warranty or liability relating to this information or the use thereof.The use of this information is governed by the Terms of Use, available at https://www.woltersSwiveluwer.com/en/know/clinical-effectiveness-terms. 2024© UpToDate, Inc. and its affiliates and/or licensors. All rights reserved.  © 2024 UpToDate, Inc. and/or its affiliates. All rights reserved.

## 2024-05-30 NOTE — ANESTHESIA PREPROCEDURE EVALUATION
Patient: Manuel Al    Procedure Information       Date/Time: 05/30/24 0730    Procedure: Open Reduction Internal Fixation right  Ankle fracture with syndesmosis repair (Right: Ankle) - 90 min. large c arm, lateral dec vs supine position    Location: [facility] OR 03 / [facility] OR    Surgeons: Julita Estrada DPM            Relevant Problems   Neuro   (+) Generalized anxiety disorder       Clinical information reviewed:   Tobacco  Allergies  Meds   Med Hx  Surg Hx   Fam Hx  Soc Hx        NPO Detail:  NPO/Void Status  Carbohydrate Drink Given Prior to Surgery? : N  Date of Last Liquid: 05/29/24  Time of Last Liquid: 2200  Date of Last Solid: 05/29/24  Time of Last Solid: 2200  Last Intake Type: Clear fluids  Time of Last Void: 0500         Physical Exam    Airway  Mallampati: II  TM distance: >3 FB  Neck ROM: full     Cardiovascular   Rhythm: regular     Dental    Pulmonary    Abdominal        Anesthesia Plan    History of general anesthesia?: yes  History of complications of general anesthesia?: no    ASA 2     general and regional     intravenous induction   Anesthetic plan and risks discussed with patient.

## 2024-05-30 NOTE — BRIEF OP NOTE
Date: 2024  OR Location: [facility] OR    Name: Manuel Al, : 2000, Age: 23 y.o., MRN: [id number], Sex: male    Diagnosis  Pre-op Diagnosis     * Closed displaced pilon fracture of right tibia, initial encounter [X01.079J] Post-op Diagnosis     * Closed displaced pilon fracture of right tibia, initial encounter [E79.369B]     Procedures  Open Reduction Internal Fixation right  Ankle fracture with syndesmosis repair  18795 - AL OPEN TREATMENT POSTERIOR MALLEOLUS FRACTURE      Surgeons      * Julita Estrada - Primary    Resident/Fellow/Other Assistant:  Assistant - Juan    Procedure Summary  Anesthesia: General  ASA: II  Anesthesia Staff: Anesthesiologist: Benny Yan MD  Estimated Blood Loss: 25 mL  Intra-op Medications: Administrations occurring from 0730 to 0920 on 24:  * No intraprocedure medications in log *           Anesthesia Record               Intraprocedure I/O Totals          Intake    Neostigmine 0.00 mL    The total shown is the total volume documented since Anesthesia Start was filed.    Total Intake 0 mL          Specimen: No specimens collected     Staff:   Circulator: Lalito Davisub Person: Sarahi  Circulator: Dari  Scrub Person: Antonio    Findings: hemostasis controlled. Fracture and ankle reduction performed. See detailed operation report    Complications:  None; patient tolerated the procedure well.     Disposition: PACU - hemodynamically stable.  Condition: stable  Specimens Collected: No specimens collected  Attending Attestation: I was present and scrubbed for the entire procedure.    Julita Estrada  Phone Number: [phone]

## 2024-05-30 NOTE — ANESTHESIA PROCEDURE NOTES
Peripheral Block    Patient location during procedure: pre-op  Reason for block: at surgeon's request  Staffing  Performed: attending   Authorized by: Benny Yan MD    Performed by: Benny Yan MD  Preanesthetic Checklist  Completed: patient identified, IV checked, site marked, risks and benefits discussed, surgical consent, monitors and equipment checked, pre-op evaluation and timeout performed   Timeout performed at:   Peripheral Block  Patient position: laying flat  Prep: ChloraPrep  Patient monitoring: heart rate, cardiac monitor and continuous pulse ox  Block type: popliteal  Laterality: right  Injection technique: single-shot  Guidance: ultrasound guided  Local infiltration: lidocaine  Needle  Needle type: short-bevel   Needle gauge: 22 G  Needle localization: nerve stimulator and ultrasound guidance  Test dose: negative  Assessment  Injection assessment: negative aspiration for heme and no paresthesia on injection  Heart rate change: no  Slow fractionated injection: yes

## 2024-05-30 NOTE — ANESTHESIA POSTPROCEDURE EVALUATION
Patient: Manuel Al    Procedure Summary       Date: 05/30/24 Room / Location: [facility] OR 03 / [facility]    Anesthesia Start: 0733 Anesthesia Stop: 0909    Procedure: Open Reduction Internal Fixation right  Ankle fracture with syndesmosis repair (Right: Ankle) Diagnosis:       Closed displaced pilon fracture of right tibia, initial encounter      (Closed displaced pilon fracture of right tibia, initial encounter [S82.879A])    Surgeons: Julita Estrada DPM Responsible Provider: Benny Yan MD    Anesthesia Type: general, regional ASA Status: 2            Anesthesia Type: general, regional    Vitals Value Taken Time   Vitals:    05/30/24 0623   BP: (!) 144/92   Pulse: 72   Resp: 16   Temp: 36.8 °C (98.2 °F)   SpO2: 97%       05/30/24 0909     05/30/24 0909     05/30/24 0909     05/30/24 0909     05/30/24 0909       Anesthesia Post Evaluation    Patient location during evaluation: bedside  Patient participation: complete - patient participated  Level of consciousness: sleepy but conscious  Pain score: 1  Pain management: adequate  Airway patency: patent  Cardiovascular status: acceptable  Respiratory status: acceptable  Hydration status: acceptable  Postoperative Nausea and Vomiting: none      No notable events documented.

## 2024-05-30 NOTE — OP NOTE
Open Reduction Internal Fixation right  Ankle fracture with syndesmosis repair (R) Operative Note     Date: 2024  OR Location: [facility] OR    Name: Manuel Al, : 2000, Age: 23 y.o., MRN: [id number], Sex: male    Diagnosis  Pre-op Diagnosis     * Closed displaced pilon fracture of right tibia, initial encounter [X24.556Z] Post-op Diagnosis     * Closed displaced pilon fracture of right tibia, initial encounter [F06.190B]     Procedures  Open Reduction Internal Fixation right  Ankle fracture with syndesmosis repair  [id number] - MI OPEN TREATMENT POSTERIOR MALLEOLUS FRACTURE      Surgeons      * Julita Estrada - Primary    Resident/Fellow/Other Assistant:  Assistant - Juan    Procedure Summary  Anesthesia: General  ASA: II  Anesthesia Staff: Anesthesiologist: Benny Yan MD  Estimated Blood Loss: 25mL  Intra-op Medications: Administrations occurring from 0730 to 0920 on 24:  * No intraprocedure medications in log *           Anesthesia Record               Intraprocedure I/O Totals          Intake    Neostigmine 0.00 mL    The total shown is the total volume documented since Anesthesia Start was filed.    Total Intake 0 mL          Specimen: No specimens collected     Staff:   Circulator: Lalito Davisub Person: Sarahi  Circulator: Dari Davisub Person: Antonio    Drains and/or Catheters: none    Tourniquet Times:     Total Tourniquet Time Documented:  Thigh (Right) - 43 minutes  Total: Thigh (Right) - 43 minutes      Implants: 3.5 zahra screws x 2    Findings: hemostasis controlled. Fracture and ankle reduced. See details below.    Indications: Manuel Al is an 23 y.o. male who is having surgery for Closed displaced pilon fracture of right tibia, initial encounter [S82.874U]. He has no significant past medical history . He fell out of a golf cart while he took a sharp right turn on 24. The mechanism of injury was unknown but he heard a pop and was unable to bear weight. He denies other  injuries or loss of consciousness. He had x-rays initially obtained while in the ER. His xrays demonstrate fracture the posterior tibial including about 25-30% of the articular surface with some medial gutter widening. The tibia fibula overlap appears maintained on the ankle ap view. No other acute fractures or dislocations of the ankle or foot (right) are noted. He has intact neurovascular status in clinic and pain to palpate the fracture region. No additional pain to palpation is noted to the proximal fibula, distal fibula, hindfoot or midfoot.     The preoperative indications, benefits, risk, complications, and anticipated healing time and management were reviewed in detail with the patient.  Preoperative optimization, clearance, and all diagnostic data were reviewed in detail.  The patient elects to proceed forward at this time.  Patient understands risks and complications include but are not limited to the following: Failed hardware, infection, delayed or nonhealing, need for further surgery, blood clot, allergic reaction, chronic pain syndrome, use of long-term assistive device, swelling, scar, over or under correction, nerve injury with resultant pain or numbness, recurrence, postoperative arthritis, loss of function, loss of limb, loss of life.  The surgical consent and limb were be signed.  I answered all the patient's questions to their satisfaction.     The patient was seen in the preoperative area. The risks, benefits, complications, treatment options, non-operative alternatives, expected recovery and outcomes were discussed with the patient. The possibilities of reaction to medication, pulmonary aspiration, injury to surrounding structures, bleeding, recurrent infection, the need for additional procedures, failure to diagnose a condition, and creating a complication requiring transfusion or operation were discussed with the patient. The patient concurred with the proposed plan, giving informed consent.   The site of surgery was properly noted/marked if necessary per policy. The patient has been actively warmed in preoperative area. Preoperative antibiotics have been ordered and given within 1 hours of incision. Venous thrombosis prophylaxis have been ordered including unilateral sequential compression device    Procedure in detail:    The patient was transported to the operating room via cart and placed on the operating room table in the prone position.  Final verification of the patient, surgery, and limb designation was performed via the timeout procedure.  The anesthesia team initiated planned anesthesia.  Preoperative regional popliteal block was provided by anesthia physician.   A well-padded pneumatic right thigh tourniquet was placed on the surgical limb.  The surgical limb was prepped and draped in the usual aseptic manner.  Surgery began in the following manner:     An Esmarch bandage was used to exsanguinate the limb and the tourniquet was inflated at this time.  A linear 8 cm incision was made to the posterior lateral ankle posterior to the fibular border.  This incision made through the skin.  Blunt dissection was performed down to the posterior crural fascia taking care to identify, protect, and retract all neurovascular structures at this time and throughout the remainder of the surgery.  A rent was made into this posterior fascial compartment and the peroneal tendons and flexor hallucis longus tendon were clearly identified.  The peroneal tendons were retracted anteriorly and flexor hallucis longus muscle belly medial so the posterior fracture fragment was identified directly.  Sharp resection of the periosteum was performed only at the fracture fragment site to allow mobilization.  This was temporarily fixated with guidewires and utilizing proper AO fixation technique, two cannulated partially threaded 3.5 screws were applied to allow compression and full fracture reduction.  Improvement of the  articular alignment was achieved to an anatomic position without gapping or step op.  Proper hardware placement including trajectory and placement was confirmed with intraoperative fluoroscopy.  The joint surfaces are well aligned and the fibula maintained the desired length.  The syndesmosis was next stressed with a cotton test, stress eversion and dorsiflexion external rotation.  No instability was noted.  Therefore additional fixation was not deemed necessary.  Copious saline irrigation was performed.  Deep closure was achieved with Vicryl.  The tourniquet was deflated at this time and pressure was applied to maintain hemostasis with minimal electrocauterization was required.  No pulsatile bleeding was noted.  There was brisk capillary refill time to all digits of the right foot at this time.  The skin was reapproximated with 3-0 nylon suture using horizontal mattress technique. A dressing consisting of Adaptic soaked in Betadine, gauze, abdominal pads, and Kerlix was applied.  Lastly, A well-padded posterior mold was next applied with the right lower extremity in a rectus position.     After procedure:     The patient tolerated the procedure and anesthesia well.  The patient was transported to the recovery area with vital signs stable and vascular status intact to the surgical limb.  Electronic orders were placed.  To ice elevate for pain and inflammation management.  Pain medication plan in place; to take in a safe manner as prescribed.   Clarified weightbearing status; non weightbear right lower extremity with splint intact.  Keep dressing and splint clean, dry, and intact until follow up next week at [facility].   The patient will further be transferred home upon continued stability.     Complications:  None; patient tolerated the procedure well.    Disposition: PACU - hemodynamically stable.  Condition: stable     Attending Attestation: I was present and scrubbed for the entire  procedure.    Julita Estrada DPM FACFAS  [facility]

## 2024-06-21 ENCOUNTER — HOSPITAL ENCOUNTER (OUTPATIENT)
Dept: RADIOLOGY | Facility: CLINIC | Age: 24
Discharge: HOME | End: 2024-06-21
Payer: COMMERCIAL

## 2024-06-21 DIAGNOSIS — M25.571 PAIN IN RIGHT ANKLE AND JOINTS OF RIGHT FOOT: ICD-10-CM

## 2024-06-21 PROCEDURE — 73610 X-RAY EXAM OF ANKLE: CPT | Mod: RT

## 2024-07-19 ENCOUNTER — HOSPITAL ENCOUNTER (OUTPATIENT)
Dept: RADIOLOGY | Facility: CLINIC | Age: 24
Discharge: HOME | End: 2024-07-19
Payer: COMMERCIAL

## 2024-07-19 DIAGNOSIS — M25.571 PAIN IN RIGHT ANKLE AND JOINTS OF RIGHT FOOT: ICD-10-CM

## 2024-07-19 PROCEDURE — 73610 X-RAY EXAM OF ANKLE: CPT | Mod: RT

## 2024-07-25 ENCOUNTER — EVALUATION (OUTPATIENT)
Dept: PHYSICAL THERAPY | Facility: CLINIC | Age: 24
End: 2024-07-25
Payer: COMMERCIAL

## 2024-07-25 DIAGNOSIS — R26.9 GAIT ABNORMALITY: ICD-10-CM

## 2024-07-25 DIAGNOSIS — S82.899A ANKLE FRACTURE: Primary | ICD-10-CM

## 2024-07-25 PROCEDURE — 97110 THERAPEUTIC EXERCISES: CPT | Mod: GP

## 2024-07-25 PROCEDURE — 97161 PT EVAL LOW COMPLEX 20 MIN: CPT | Mod: GP

## 2024-07-25 ASSESSMENT — PAIN - FUNCTIONAL ASSESSMENT: PAIN_FUNCTIONAL_ASSESSMENT: 0-10

## 2024-07-25 ASSESSMENT — PAIN SCALES - GENERAL: PAINLEVEL_OUTOF10: 0 - NO PAIN

## 2024-07-25 ASSESSMENT — ENCOUNTER SYMPTOMS
LOSS OF SENSATION IN FEET: 0
DEPRESSION: 0
OCCASIONAL FEELINGS OF UNSTEADINESS: 0

## 2024-07-25 ASSESSMENT — PAIN DESCRIPTION - DESCRIPTORS: DESCRIPTORS: ACHING

## 2024-07-25 NOTE — PROGRESS NOTES
Physical Therapy    Physical Therapy Evaluation    Patient Name: Manuel Al  MRN: [id number]  : 2000  Referring Physician: Julita Estrada  Today's Date: 2024  Time Calculation  Start Time: 1230  Stop Time: 1308  Time Calculation (min): 38 min  PT Evaluation Time Entry  PT Evaluation (Low) Time Entry: 26  PT Therapeutic Procedures Time Entry  Therapeutic Exercise Time Entry: 12           General  Reason for Referral: R ankle fx  Referred By: Julita Estrada  General Comment: Visit  POC    Current Problem  Problem List Items Addressed This Visit             ICD-10-CM       Musculoskeletal and Injuries    Ankle fracture - Primary S82.899A    Relevant Orders    Follow Up In Physical Therapy       Symptoms and Signs    Gait abnormality R26.9          SUBJECTIVE  Subjective   Patient reports broken R distal tibia that began on 24 with surgical fixation on 24 . This is leading to difficulty with walking, weightbearing, stairs, squatting, running, jumping, and return to active lifestyle.  Pain is reported to range 0-5/10 with daily activities.  Patient states that their goal is to regain function with physical therapy intervention.    Prior level of function: WNL    Patient's name and  were confirmed this date.    Pt broke R tibia on  with ORIF fixation on . Pt currently presents in walking boot. Pt was told on  to start at 50% wbing then to work up to 100% but pt has been able to ambulate with 100% in walking boot with just some noted discomfort. 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 Pt reports some N+T in R heel. Pt notices increases in pain with increased weightbearing activity.     Precautions  Precautions  STEADI Fall Risk Score (The score of 4 or more indicates an increased risk of falling): 0  Precautions Comment: Prior foot surgeries It is ok to progress from partial to full weightbearing as tolerated in boot and then  "transition to shoe over the next month per surgeon        Pain  Pain Assessment: 0-10  0-10 (Numeric) Pain Score: 0 - No pain (At worst a 4-5 with increased wbing)  Pain Type: Surgical pain  Pain Location: Ankle  Pain Orientation: Right  Pain Descriptors: Aching  Pain Frequency: Intermittent    Barriers to Participation: None    OBJECTIVE:    Lower Extremity Strength: (5/5 unless noted)  MMT RIGHT LEFT   Hip Flexion     Hip Extension     Hip Abduction     Hip Adduction     Knee Extension     Knee Flexion     Ankle DF 5 5   Ankle PF 4 5   Ankle INV 4 5   Ankle EV 4 5     Lower Extremity ROM: WNL unless documented below:  ROM in Degrees  RIGHT LEFT   Hip Flexion     Hip Extension     Hip Abduction     Hip Adduction     Knee Extension     Knee Flexion      Ankle DF (20) 2 11   Ankle PF (30-50) 46 53   Ankle INV (20) 21 23   Ankle EV (10) 10 12     Joint mobility: Decreased talocural posterior glide on R  Gait mechanics: In walking boot, R lateral lean when R wbing  Palpation: No TTP   Neurological symptoms: NT  Muscle Flexibility: Nt  Special tests:  NT    Other Measures  Lower Extremity Funtional Score (LEFS): 55       TREATMENT:     4-way ankle with lime band x10  DF with strap assist 5\" hold x10  Weight Shifts side to side x10    PATIENT EDUCATION:  Access Code: [email or web address]  URL: [email or web address]  Date: 07/25/2024  Prepared by: Chuck Adams    Exercises  - Long Sitting Ankle Eversion with Resistance  - 1 x daily - 7 x weekly - 3 sets - 10 reps  - Long Sitting Ankle Inversion with Resistance  - 1 x daily - 7 x weekly - 3 sets - 10 reps  - Long Sitting Ankle Plantar Flexion with Resistance  - 1 x daily - 7 x weekly - 3 sets - 10 reps  - Long Sitting Ankle Dorsiflexion with Anchored Resistance  - 1 x daily - 7 x weekly - 3 sets - 10 reps  - Long Sitting Calf Stretch with Strap  - 1 x daily - 7 x weekly - 3 sets - 10 reps - 5s hold  - Side to Side Weight Shift with Counter Support  - 1 x daily - " 7 x weekly - 3 sets - 10 reps    ASSESSEMENT  Pt is a 23 y.o.  referred for physical therapy by Julita Estrada DPM  for R ankle fracture post ORIF. Pt presents with decreased ankle ROM, decreased ankle strength, gait abnormalities, and difficulty weightbearing that is affecting walking, stairs, squatting, jumping, jogging, and active lifestyle. This patient would benefit from a therapy program to restore prior level of function, decrease pain, increase AROM, and increase strength. Pt tolerated all treatment at this time.     Rehab potential: Good    Plan for next visit:  Review HEP, ankle strengthening, ankle ROM, BAPS board, progress wbing    PLAN  Treatment/Interventions: Aquatic therapy, Blood flow restriction therapy, Biofeedback, Canalith repositioning, Cryotherapy, Dry needling, Education/ Instruction, Electrical stimulation, Fluidotherapy, Gait training, Hot pack, Iontophoresis, Laser, Manual therapy, Mechanical traction, Neuromuscular re-education, Orthosis fit/ training, Self care/ home management, Taping techniques, Therapeutic activities, Therapeutic exercises, Ultrasound, Vasopneumatic device  PT Plan: Skilled PT  PT Frequency: 1 time per week  Duration: 8 weeks  Onset Date: 05/18/24  Rehab Potential: Good  Plan of Care Agreement: Patient    Pt. Is in agreement with PT plan.  Goals:  Active       PT Problem       PT R ankle Goals       Start:  07/25/24    Expected End:  10/03/24       1. Pt will adhere to and complete HEP in order to improve functionality outside of the clinic. (2 weeks)    2.   Pt will report 0/10 pain when performing all weight bearing activities in order to improve walking, stairs, running, jumping, and participating in an active lifestyle. (6-8 weeks)    3.   Pt will ambulate with normal gait pattern to improve energy efficiency and decrease fall risk. (6-8 weeks)    4.   Pt will increase strength of R ankle to  5/5 in order to improve weightbearing, walking, ability to complete  stairs, running, jumping, and to participate in an active lifestyle.     5.   Pt will improve ROM of R ankle DF to 10 degrees in order to improve gait mechanics, ability to complete stairs, squat, run, and have an active lifestyle. (5-6 weeks)    6.   Pt will complete stairs with reciprocal  pattern  to increase energy efficiency and promote functional mobility.     7.  Pt goal: Regain full ankle function     8. Pt will improve LEFS by 9 points (MCID) to show reduction in disability and improvement in functionality. (6-8 weeks)

## 2024-08-01 ENCOUNTER — TREATMENT (OUTPATIENT)
Dept: PHYSICAL THERAPY | Facility: CLINIC | Age: 24
End: 2024-08-01
Payer: COMMERCIAL

## 2024-08-01 DIAGNOSIS — S82.899A ANKLE FRACTURE: ICD-10-CM

## 2024-08-01 PROCEDURE — 97110 THERAPEUTIC EXERCISES: CPT | Mod: GP,CQ

## 2024-08-01 ASSESSMENT — PAIN - FUNCTIONAL ASSESSMENT: PAIN_FUNCTIONAL_ASSESSMENT: 0-10

## 2024-08-01 ASSESSMENT — PAIN SCALES - GENERAL: PAINLEVEL_OUTOF10: 0 - NO PAIN

## 2024-08-01 NOTE — PROGRESS NOTES
Physical Therapy Treatment    Patient Name: Manuel Al  MRN: [id number]  Today's Date: 8/1/2024  Time Calculation  Start Time: 1400  Stop Time: 1443  Time Calculation (min): 43 min  PT Therapeutic Procedures Time Entry  Therapeutic Exercise Time Entry: 41       Assessment:   Patient identified by name and date of birth. Patient ambulated back to treatment area with boot, he required cues to decrease with external hip/foot rotation added hip abd with cues for neutral foot/hip. He was able to progress with ROM w/o increased sx noted.     OBJECTIVE     Plan:  OP PT Plan  Treatment/Interventions: Aquatic therapy, Blood flow restriction therapy, Biofeedback, Canalith repositioning, Cryotherapy, Dry needling, Education/ Instruction, Electrical stimulation, Fluidotherapy, Gait training, Hot pack, Iontophoresis, Laser, Manual therapy, Mechanical traction, Neuromuscular re-education, Orthosis fit/ training, Self care/ home management, Taping techniques, Therapeutic activities, Therapeutic exercises, Ultrasound, Vasopneumatic device  PT Plan: Skilled PT  PT Frequency: 1 time per week  Duration: 8 weeks  Onset Date: 05/18/24  Rehab Potential: Good  Plan of Care Agreement: Patient  Continue with ROM and strengthening as tolerated for increased ease with ambulation.   Current Problem  Problem List Items Addressed This Visit             ICD-10-CM    Ankle fracture S82.899A       Subjective Patient reported compliance with % of the time and verbalized understanding.  Patient reported he is 100% wb with ambulation and only has pain if he std to long and it goes away if he rests.   General  Reason for Referral: R ankle fx  Referred By: Julita Estrada  General Comment: Visit 2/9 POC  Precautions  Precautions  Precautions Comment: Prior foot surgeries    Pain  Pain Assessment: 0-10  0-10 (Numeric) Pain Score: 0 - No pain     Treatments:  Therapeutic Exercise  Therapeutic Exercise Performed: Yes  Standing weight shifts fwd/lat  "x10 ea   Standing hip abd standing on L 2 x 10   Seated heel/toe raises 2 x 10   Seated Baps board x20   -DF/PF, IV/EV, CW,CCW   Towel towel crunch 2 x 1'   Seated DF strap stretch x10 10\" hold   Seated 4 way ankle lime band 2 x 10   ABC trace 1 cycle      OP EDUCATION:   [email or web address]  URL: [email or web address]  Date: 07/25/2024  Prepared by: Chuck Adams     Exercises  - Long Sitting Ankle Eversion with Resistance  - 1 x daily - 7 x weekly - 3 sets - 10 reps  - Long Sitting Ankle Inversion with Resistance  - 1 x daily - 7 x weekly - 3 sets - 10 reps  - Long Sitting Ankle Plantar Flexion with Resistance  - 1 x daily - 7 x weekly - 3 sets - 10 reps  - Long Sitting Ankle Dorsiflexion with Anchored Resistance  - 1 x daily - 7 x weekly - 3 sets - 10 reps  - Long Sitting Calf Stretch with Strap  - 1 x daily - 7 x weekly - 3 sets - 10 reps - 5s hold  - Side to Side Weight Shift with Counter Support  - 1 x daily - 7 x weekly - 3 sets - 10 reps    Goals:  Active       PT Problem       PT R ankle Goals       Start:  07/25/24    Expected End:  10/03/24       1. Pt will adhere to and complete HEP in order to improve functionality outside of the clinic. (2 weeks)    2.   Pt will report 0/10 pain when performing all weight bearing activities in order to improve walking, stairs, running, jumping, and participating in an active lifestyle. (6-8 weeks)    3.   Pt will ambulate with normal gait pattern to improve energy efficiency and decrease fall risk. (6-8 weeks)    4.   Pt will increase strength of R ankle to  5/5 in order to improve weightbearing, walking, ability to complete stairs, running, jumping, and to participate in an active lifestyle.     5.   Pt will improve ROM of R ankle DF to 10 degrees in order to improve gait mechanics, ability to complete stairs, squat, run, and have an active lifestyle. (5-6 weeks)    6.   Pt will complete stairs with reciprocal  pattern  to increase energy efficiency and " promote functional mobility.     7.  Pt goal: Regain full ankle function     8. Pt will improve LEFS by 9 points (MCID) to show reduction in disability and improvement in functionality. (6-8 weeks)

## 2024-08-09 ENCOUNTER — TREATMENT (OUTPATIENT)
Dept: PHYSICAL THERAPY | Facility: CLINIC | Age: 24
End: 2024-08-09
Payer: COMMERCIAL

## 2024-08-09 DIAGNOSIS — S82.899A ANKLE FRACTURE: ICD-10-CM

## 2024-08-09 PROCEDURE — 97110 THERAPEUTIC EXERCISES: CPT | Mod: GP,CQ

## 2024-08-09 ASSESSMENT — PAIN - FUNCTIONAL ASSESSMENT: PAIN_FUNCTIONAL_ASSESSMENT: 0-10

## 2024-08-09 ASSESSMENT — PAIN SCALES - GENERAL: PAINLEVEL_OUTOF10: 0 - NO PAIN

## 2024-08-09 NOTE — PROGRESS NOTES
Physical Therapy Treatment    Patient Name: Manuel Al  MRN: [id number]  Today's Date: 8/9/2024  Time Calculation  Start Time: 1315  Stop Time: 1357  Time Calculation (min): 42 min  PT Therapeutic Procedures Time Entry  Therapeutic Exercise Time Entry: 40       Assessment:   Patient able to tolerate session with mild challenges. Demo's fatigue in glutes with standing isometric PRE's but subsides quickly. Patient tolerates progressions of reps with no c/o increased symptoms. Demo's improvements pre and post session.     Plan:  Continue to work on progressing ROM and strength to be able to tolerate prolonged ambulation with little to no difficulty. -AB.     OP PT Plan  Treatment/Interventions: Aquatic therapy, Blood flow restriction therapy, Biofeedback, Canalith repositioning, Cryotherapy, Dry needling, Education/ Instruction, Electrical stimulation, Fluidotherapy, Gait training, Hot pack, Iontophoresis, Laser, Manual therapy, Mechanical traction, Neuromuscular re-education, Orthosis fit/ training, Self care/ home management, Taping techniques, Therapeutic activities, Therapeutic exercises, Ultrasound, Vasopneumatic device  PT Plan: Skilled PT  PT Frequency: 1 time per week  Duration: 8 weeks  Onset Date: 05/18/24  Rehab Potential: Good  Plan of Care Agreement: Patient    Current Problem  Problem List Items Addressed This Visit             ICD-10-CM    Ankle fracture S82.899A       Subjective   General  Reason for Referral: R ankle fx  Referred By: Julita Estrada  General Comment: Visit 3/9 POC  HEP: Yes  Patient states that he is doing his HEP every day. States that after he's done he really feels better about his foot.     Precautions  Precautions  Precautions Comment: Prior foot surgeries    Pain  Pain Assessment: 0-10  0-10 (Numeric) Pain Score: 0 - No pain    Objective     Treatments:  Therapeutic Exercise: 40 minutes, 3 units  SciFit x6' (N)  Standing weight shifts fwd/lat 2x10 ea (P, reps)   Standing hip abd  "standing on L 2 x 10   Seated heel/toe raises 2 x 10   Seated Baps board x20 lvl 2   -DF/PF, IV/EV, CW,CCW   Towel towel crunch 2 x 1'   Seated DF strap stretch x10 10\" hold   Seated 4 way ankle lime band 2 x 10   ABC trace 1 cycle     OP EDUCATION:   [email or web address]  URL: [email or web address]  Date: 07/25/2024  Prepared by: Chuck Adams     Exercises  - Long Sitting Ankle Eversion with Resistance  - 1 x daily - 7 x weekly - 3 sets - 10 reps  - Long Sitting Ankle Inversion with Resistance  - 1 x daily - 7 x weekly - 3 sets - 10 reps  - Long Sitting Ankle Plantar Flexion with Resistance  - 1 x daily - 7 x weekly - 3 sets - 10 reps  - Long Sitting Ankle Dorsiflexion with Anchored Resistance  - 1 x daily - 7 x weekly - 3 sets - 10 reps  - Long Sitting Calf Stretch with Strap  - 1 x daily - 7 x weekly - 3 sets - 10 reps - 5s hold  - Side to Side Weight Shift with Counter Support  - 1 x daily - 7 x weekly - 3 sets - 10 reps    Goals:  Active       PT Problem       PT R ankle Goals       Start:  07/25/24    Expected End:  10/03/24       1. Pt will adhere to and complete HEP in order to improve functionality outside of the clinic. (2 weeks)    2.   Pt will report 0/10 pain when performing all weight bearing activities in order to improve walking, stairs, running, jumping, and participating in an active lifestyle. (6-8 weeks)    3.   Pt will ambulate with normal gait pattern to improve energy efficiency and decrease fall risk. (6-8 weeks)    4.   Pt will increase strength of R ankle to  5/5 in order to improve weightbearing, walking, ability to complete stairs, running, jumping, and to participate in an active lifestyle.     5.   Pt will improve ROM of R ankle DF to 10 degrees in order to improve gait mechanics, ability to complete stairs, squat, run, and have an active lifestyle. (5-6 weeks)    6.   Pt will complete stairs with reciprocal  pattern  to increase energy efficiency and promote functional " mobility.     7.  Pt goal: Regain full ankle function     8. Pt will improve LEFS by 9 points (MCID) to show reduction in disability and improvement in functionality. (6-8 weeks)

## 2024-08-16 ENCOUNTER — TREATMENT (OUTPATIENT)
Dept: PHYSICAL THERAPY | Facility: CLINIC | Age: 24
End: 2024-08-16
Payer: COMMERCIAL

## 2024-08-16 ENCOUNTER — HOSPITAL ENCOUNTER (OUTPATIENT)
Dept: RADIOLOGY | Facility: CLINIC | Age: 24
Discharge: HOME | End: 2024-08-16
Payer: COMMERCIAL

## 2024-08-16 DIAGNOSIS — M25.571 PAIN IN RIGHT ANKLE AND JOINTS OF RIGHT FOOT: ICD-10-CM

## 2024-08-16 DIAGNOSIS — S82.899A ANKLE FRACTURE: ICD-10-CM

## 2024-08-16 PROCEDURE — 97110 THERAPEUTIC EXERCISES: CPT | Mod: GP,CQ

## 2024-08-16 PROCEDURE — 73610 X-RAY EXAM OF ANKLE: CPT | Mod: RT

## 2024-08-16 ASSESSMENT — PAIN SCALES - GENERAL: PAINLEVEL_OUTOF10: 1

## 2024-08-16 ASSESSMENT — PAIN - FUNCTIONAL ASSESSMENT: PAIN_FUNCTIONAL_ASSESSMENT: 0-10

## 2024-08-16 NOTE — PROGRESS NOTES
Physical Therapy Treatment    Patient Name: Manuel Al  MRN: [id number]  Today's Date: 8/16/2024  Time Calculation  Start Time: 1315  Stop Time: 1357  Time Calculation (min): 42 min  PT Therapeutic Procedures Time Entry  Therapeutic Exercise Time Entry: 40       Assessment:   Patient able to tolerate WB throughout session. Demo's good form throughout session, however does demo fatigue at end of session with mild soreness noted. HEP updated and reviewed with patient yeison'g good understanding.     Plan:  Continue to work on improving strength and ROM to be able to tolerate prolonged ambulation with little to no difficulty. -AB.     OP PT Plan  Treatment/Interventions: Aquatic therapy, Blood flow restriction therapy, Biofeedback, Canalith repositioning, Cryotherapy, Dry needling, Education/ Instruction, Electrical stimulation, Fluidotherapy, Gait training, Hot pack, Iontophoresis, Laser, Manual therapy, Mechanical traction, Neuromuscular re-education, Orthosis fit/ training, Self care/ home management, Taping techniques, Therapeutic activities, Therapeutic exercises, Ultrasound, Vasopneumatic device  PT Plan: Skilled PT  PT Frequency: 1 time per week  Duration: 8 weeks  Onset Date: 05/18/24  Rehab Potential: Good  Plan of Care Agreement: Patient    Current Problem  Problem List Items Addressed This Visit             ICD-10-CM    Ankle fracture S82.899A       Subjective   General  Reason for Referral: R ankle fx  Referred By: Julita Estrada  General Comment: Visit 4/9 POC  HEP: Yes  Patient states that he went and saw the dr this morning, and states that he is able to be in a shoe for 1 hour a day. States that everything is healing well. States that the dr is happy with how things are healing.     Precautions  Precautions  Precautions Comment: Prior foot surgeries    Pain  Pain Assessment: 0-10  0-10 (Numeric) Pain Score: 1  Pain Location: Ankle  Pain Orientation: Right    Objective     Treatments:  Therapeutic  "Exercise: 40 minutes, 3 units  SciFit x6' (N)  Slantboard 2x1' (N)  Step ups 4\" step x15 R Fwd/Lateral (N)  Standing heel raises x15 Bilat (N)  Standing weight shifts fwd/lat 2x10 ea (P, reps)   Standing hip abd standing on L 2 x 10   Seated heel/toe raises 2 x 10 (X)  Seated big toe extension 2x10 R (N)  Seated 4 toe extension 2x10 R (N)  Standing mini squats x15 (N)  Seated Baps board x20 lvl 2 (X)  -DF/PF, IV/EV, CW,CCW   Towel towel crunch 2 x 1'   Seated DF strap stretch x10 10\" hold (X)  Seated 4 way ankle lime band 2 x 10 (X)  ABC trace 1 cycle    OP EDUCATION:  Access Code: [email or web address]  URL: [email or web address]  Date: 08/16/2024  Prepared by: Idalia Blount    Exercises  - Seated Lesser Toes Extension  - 1 x daily - 7 x weekly - 3 sets - 10 reps  - Standing Heel Raises  - 1 x daily - 7 x weekly - 3 sets - 10 reps  - Step Up  - 1 x daily - 7 x weekly - 3 sets - 10 reps  - Seated Great Toe Extension  - 1 x daily - 7 x weekly - 3 sets - 10 reps     [email or web address]  URL: [email or web address]  Date: 07/25/2024  Prepared by: Chuck Adams     Exercises  - Long Sitting Ankle Eversion with Resistance  - 1 x daily - 7 x weekly - 3 sets - 10 reps  - Long Sitting Ankle Inversion with Resistance  - 1 x daily - 7 x weekly - 3 sets - 10 reps  - Long Sitting Ankle Plantar Flexion with Resistance  - 1 x daily - 7 x weekly - 3 sets - 10 reps  - Long Sitting Ankle Dorsiflexion with Anchored Resistance  - 1 x daily - 7 x weekly - 3 sets - 10 reps  - Long Sitting Calf Stretch with Strap  - 1 x daily - 7 x weekly - 3 sets - 10 reps - 5s hold  - Side to Side Weight Shift with Counter Support  - 1 x daily - 7 x weekly - 3 sets - 10 reps    Goals:  Active       PT Problem       PT R ankle Goals       Start:  07/25/24    Expected End:  10/03/24       1. Pt will adhere to and complete HEP in order to improve functionality outside of the clinic. (2 weeks)    2.   Pt will report 0/10 pain when " performing all weight bearing activities in order to improve walking, stairs, running, jumping, and participating in an active lifestyle. (6-8 weeks)    3.   Pt will ambulate with normal gait pattern to improve energy efficiency and decrease fall risk. (6-8 weeks)    4.   Pt will increase strength of R ankle to  5/5 in order to improve weightbearing, walking, ability to complete stairs, running, jumping, and to participate in an active lifestyle.     5.   Pt will improve ROM of R ankle DF to 10 degrees in order to improve gait mechanics, ability to complete stairs, squat, run, and have an active lifestyle. (5-6 weeks)    6.   Pt will complete stairs with reciprocal  pattern  to increase energy efficiency and promote functional mobility.     7.  Pt goal: Regain full ankle function     8. Pt will improve LEFS by 9 points (MCID) to show reduction in disability and improvement in functionality. (6-8 weeks)

## 2024-08-23 ENCOUNTER — TREATMENT (OUTPATIENT)
Dept: PHYSICAL THERAPY | Facility: CLINIC | Age: 24
End: 2024-08-23
Payer: COMMERCIAL

## 2024-08-23 DIAGNOSIS — S82.899A ANKLE FRACTURE: ICD-10-CM

## 2024-08-23 PROCEDURE — 97110 THERAPEUTIC EXERCISES: CPT | Mod: GP,CQ

## 2024-08-23 ASSESSMENT — PAIN SCALES - GENERAL: PAINLEVEL_OUTOF10: 0 - NO PAIN

## 2024-08-23 ASSESSMENT — PAIN - FUNCTIONAL ASSESSMENT: PAIN_FUNCTIONAL_ASSESSMENT: 0-10

## 2024-08-23 NOTE — PROGRESS NOTES
"Physical Therapy Treatment    Patient Name: Manuel Al  MRN: [id number]  Today's Date: 8/23/2024  Time Calculation  Start Time: 1315  Stop Time: 1359  Time Calculation (min): 44 min  PT Therapeutic Procedures Time Entry  Therapeutic Exercise Time Entry: 42       Assessment:   Patient appropriately challenged. Patient able to progress with proprioception and challenged with SLS this date. Patient demo's no c/o increased symptoms pre and post session.     Plan:  Continue to work on improving strength and ROM to be able to tolerate normal daily tasks with little to no challenges. -AB.     OP PT Plan  Treatment/Interventions: Aquatic therapy, Blood flow restriction therapy, Biofeedback, Canalith repositioning, Cryotherapy, Dry needling, Education/ Instruction, Electrical stimulation, Fluidotherapy, Gait training, Hot pack, Iontophoresis, Laser, Manual therapy, Mechanical traction, Neuromuscular re-education, Orthosis fit/ training, Self care/ home management, Taping techniques, Therapeutic activities, Therapeutic exercises, Ultrasound, Vasopneumatic device  PT Plan: Skilled PT  PT Frequency: 1 time per week  Duration: 8 weeks  Onset Date: 05/18/24  Rehab Potential: Good  Plan of Care Agreement: Patient    Current Problem  Problem List Items Addressed This Visit             ICD-10-CM    Ankle fracture S82.899A       Subjective   General  Reason for Referral: R ankle fx  Referred By: Julita Estrada  General Comment: Visit 5/9 POC  HEP: Yes  Patient states that he's not currently having pain.     Precautions  Precautions  Precautions Comment: Prior foot surgeries    Pain  Pain Assessment: 0-10  0-10 (Numeric) Pain Score: 0 - No pain  Pain Location: Ankle  Pain Orientation: Right    Objective     Treatments:  Therapeutic Exercise: 42 minutes, 3 units  Recumbent bike x6' (N)  SciFit x6' (X)  Slantboard 2x1'   Step ups 18\" step x15 R Fwd/Lateral (P, surface)  Sports cord x5 2 cords Fwd/Bkwd/Lateral (N)  Standing heel raises " "x15 Bilat (N)  SLS 3x30\" R (N)  Tandem ambulation 16' x2 (N)  Fwd lunge R leading x15 (N)  Squats x10 (N)    Not today: 8-23-24  Standing weight shifts fwd/lat 2x10 ea (X)  Standing hip abd standing Bilat 2 x 10   Seated heel/toe raises 2 x 10 (X)  Seated big toe extension 2x10 R (N)  Seated 4 toe extension 2x10 R (N)  Standing mini squats x15 (N)  Seated Baps board x20 lvl 2 (X)  -DF/PF, IV/EV, CW,CCW   Towel towel crunch 2 x 1'   Seated DF strap stretch x10 10\" hold (X)  Seated 4 way ankle lime band 2 x 10 (X)  ABC trace 1 cycle    OP EDUCATION:   Access Code: [email or web address]  URL: [email or web address]  Date: 08/16/2024  Prepared by: Idalia Blount    Exercises  - Seated Lesser Toes Extension  - 1 x daily - 7 x weekly - 3 sets - 10 reps  - Standing Heel Raises  - 1 x daily - 7 x weekly - 3 sets - 10 reps  - Step Up  - 1 x daily - 7 x weekly - 3 sets - 10 reps  - Seated Great Toe Extension  - 1 x daily - 7 x weekly - 3 sets - 10 reps     [email or web address]  URL: [email or web address]  Date: 07/25/2024  Prepared by: Chuck Adams     Exercises  - Long Sitting Ankle Eversion with Resistance  - 1 x daily - 7 x weekly - 3 sets - 10 reps  - Long Sitting Ankle Inversion with Resistance  - 1 x daily - 7 x weekly - 3 sets - 10 reps  - Long Sitting Ankle Plantar Flexion with Resistance  - 1 x daily - 7 x weekly - 3 sets - 10 reps  - Long Sitting Ankle Dorsiflexion with Anchored Resistance  - 1 x daily - 7 x weekly - 3 sets - 10 reps  - Long Sitting Calf Stretch with Strap  - 1 x daily - 7 x weekly - 3 sets - 10 reps - 5s hold  - Side to Side Weight Shift with Counter Support  - 1 x daily - 7 x weekly - 3 sets - 10 reps    Goals:  Active       PT Problem       PT R ankle Goals       Start:  07/25/24    Expected End:  10/03/24       1. Pt will adhere to and complete HEP in order to improve functionality outside of the clinic. (2 weeks)    2.   Pt will report 0/10 pain when performing all weight " bearing activities in order to improve walking, stairs, running, jumping, and participating in an active lifestyle. (6-8 weeks)    3.   Pt will ambulate with normal gait pattern to improve energy efficiency and decrease fall risk. (6-8 weeks)    4.   Pt will increase strength of R ankle to  5/5 in order to improve weightbearing, walking, ability to complete stairs, running, jumping, and to participate in an active lifestyle.     5.   Pt will improve ROM of R ankle DF to 10 degrees in order to improve gait mechanics, ability to complete stairs, squat, run, and have an active lifestyle. (5-6 weeks)    6.   Pt will complete stairs with reciprocal  pattern  to increase energy efficiency and promote functional mobility.     7.  Pt goal: Regain full ankle function     8. Pt will improve LEFS by 9 points (MCID) to show reduction in disability and improvement in functionality. (6-8 weeks)

## 2024-08-30 ENCOUNTER — TREATMENT (OUTPATIENT)
Dept: PHYSICAL THERAPY | Facility: CLINIC | Age: 24
End: 2024-08-30
Payer: COMMERCIAL

## 2024-08-30 DIAGNOSIS — S82.899A ANKLE FRACTURE: ICD-10-CM

## 2024-08-30 PROCEDURE — 97110 THERAPEUTIC EXERCISES: CPT | Mod: GP,CQ

## 2024-08-30 ASSESSMENT — PAIN SCALES - GENERAL: PAINLEVEL_OUTOF10: 1

## 2024-08-30 ASSESSMENT — PAIN - FUNCTIONAL ASSESSMENT: PAIN_FUNCTIONAL_ASSESSMENT: 0-10

## 2024-08-30 NOTE — PROGRESS NOTES
"Physical Therapy Treatment    Patient Name: Manuel Al  MRN: [id number]  Today's Date: 8/30/2024  Time Calculation  Start Time: 1312  Stop Time: 1357  Time Calculation (min): 45 min  PT Therapeutic Procedures Time Entry  Therapeutic Exercise Time Entry: 42       Assessment:   Patient able to tolerate new PRE's with no c/o increased symptoms. Patient challenged with proprioception with ankle sway noted. Patient tolerates progressions of reps.     Plan:  Continue to work on improving strength and decreasing pain to be able to perform normal work duties with little to no difficulty. -AB.     OP PT Plan  Treatment/Interventions: Aquatic therapy, Blood flow restriction therapy, Biofeedback, Canalith repositioning, Cryotherapy, Dry needling, Education/ Instruction, Electrical stimulation, Fluidotherapy, Gait training, Hot pack, Iontophoresis, Laser, Manual therapy, Mechanical traction, Neuromuscular re-education, Orthosis fit/ training, Self care/ home management, Taping techniques, Therapeutic activities, Therapeutic exercises, Ultrasound, Vasopneumatic device  PT Plan: Skilled PT  PT Frequency: 1 time per week  Duration: 8 weeks  Onset Date: 05/18/24  Rehab Potential: Good  Plan of Care Agreement: Patient    Current Problem  Problem List Items Addressed This Visit             ICD-10-CM    Ankle fracture S82.899A       Subjective   General  Reason for Referral: R ankle fx  Referred By: Julita Estrada  General Comment: Visit 6/9 POC  HEP: Yes  Patient states that he's not really having pain today. States that he thought that his hip felt off when he takes his foot out of the boot.     Precautions  Precautions  Precautions Comment: Prior foot surgeries    Pain  Pain Assessment: 0-10  0-10 (Numeric) Pain Score: 1  Pain Location: Ankle  Pain Orientation: Right    Objective     Treatments:  Therapeutic Exercise: 42 minutes, 3 units  Elliptical x5' (N)  Slantboard 2x1'   Step ups 18\" step 2x10 R Fwd/Lateral (P, reps)  Sports " "cord x5 2 cords Fwd/Bkwd/Lateral   Standing heel raises x15 Bilat   SLS 3x30\" R on airex (P, surface)   Tandem ambulation 16' x2   Fwd lunge R leading 2x10 (P, reps)   Squats x10   Small balance board 2x1' (N)  Extreme balance board 2x1' (N)    Not today: 8-23-24  Recumbent bike x6' (X)  SciFit x6' (X)  Standing weight shifts fwd/lat 2x10 ea (X)  Standing hip abd standing Bilat 2 x 10   Seated heel/toe raises 2 x 10 (X)  Seated big toe extension 2x10 R (N)  Seated 4 toe extension 2x10 R (N)  Standing mini squats x15 (N)  Seated Baps board x20 lvl 2 (X)  -DF/PF, IV/EV, CW,CCW   Towel towel crunch 2 x 1'   Seated DF strap stretch x10 10\" hold (X)  Seated 4 way ankle lime band 2 x 10 (X)  ABC trace 1 cycle    OP EDUCATION:   Access Code: [email or web address]  URL: [email or web address]  Date: 08/16/2024  Prepared by: Idalia Blount    Exercises  - Seated Lesser Toes Extension  - 1 x daily - 7 x weekly - 3 sets - 10 reps  - Standing Heel Raises  - 1 x daily - 7 x weekly - 3 sets - 10 reps  - Step Up  - 1 x daily - 7 x weekly - 3 sets - 10 reps  - Seated Great Toe Extension  - 1 x daily - 7 x weekly - 3 sets - 10 reps     [email or web address]  URL: [email or web address]  Date: 07/25/2024  Prepared by: Chuck Adams     Exercises  - Long Sitting Ankle Eversion with Resistance  - 1 x daily - 7 x weekly - 3 sets - 10 reps  - Long Sitting Ankle Inversion with Resistance  - 1 x daily - 7 x weekly - 3 sets - 10 reps  - Long Sitting Ankle Plantar Flexion with Resistance  - 1 x daily - 7 x weekly - 3 sets - 10 reps  - Long Sitting Ankle Dorsiflexion with Anchored Resistance  - 1 x daily - 7 x weekly - 3 sets - 10 reps  - Long Sitting Calf Stretch with Strap  - 1 x daily - 7 x weekly - 3 sets - 10 reps - 5s hold  - Side to Side Weight Shift with Counter Support  - 1 x daily - 7 x weekly - 3 sets - 10 reps    Goals:  Active       PT Problem       PT R ankle Goals       Start:  07/25/24    Expected End:  " 10/03/24       1. Pt will adhere to and complete HEP in order to improve functionality outside of the clinic. (2 weeks)    2.   Pt will report 0/10 pain when performing all weight bearing activities in order to improve walking, stairs, running, jumping, and participating in an active lifestyle. (6-8 weeks)    3.   Pt will ambulate with normal gait pattern to improve energy efficiency and decrease fall risk. (6-8 weeks)    4.   Pt will increase strength of R ankle to  5/5 in order to improve weightbearing, walking, ability to complete stairs, running, jumping, and to participate in an active lifestyle.     5.   Pt will improve ROM of R ankle DF to 10 degrees in order to improve gait mechanics, ability to complete stairs, squat, run, and have an active lifestyle. (5-6 weeks)    6.   Pt will complete stairs with reciprocal  pattern  to increase energy efficiency and promote functional mobility.     7.  Pt goal: Regain full ankle function     8. Pt will improve LEFS by 9 points (MCID) to show reduction in disability and improvement in functionality. (6-8 weeks)

## 2024-09-06 ENCOUNTER — TREATMENT (OUTPATIENT)
Dept: PHYSICAL THERAPY | Facility: CLINIC | Age: 24
End: 2024-09-06
Payer: COMMERCIAL

## 2024-09-06 DIAGNOSIS — S82.899A ANKLE FRACTURE: ICD-10-CM

## 2024-09-06 PROCEDURE — 97110 THERAPEUTIC EXERCISES: CPT | Mod: GP,CQ | Performed by: PHYSICAL THERAPY ASSISTANT

## 2024-09-06 ASSESSMENT — PAIN SCALES - GENERAL: PAINLEVEL_OUTOF10: 0 - NO PAIN

## 2024-09-06 ASSESSMENT — PAIN - FUNCTIONAL ASSESSMENT: PAIN_FUNCTIONAL_ASSESSMENT: 0-10

## 2024-09-06 NOTE — PROGRESS NOTES
Physical Therapy    Physical Therapy Treatment    Patient Name: Manuel Al  MRN: [id number]  Today's Date: 9/6/2024  Time Calculation  Start Time: 1230  Stop Time: 1315  Time Calculation (min): 45 min  PT Therapeutic Procedures Time Entry  Therapeutic Exercise Time Entry: 42                   Current Problem  Problem List Items Addressed This Visit             ICD-10-CM       Musculoskeletal and Injuries    Ankle fracture S82.899A        General  Reason for Referral: R ankle fx  Referred By: Julita Estrada  General Comment: VISIT 7/9 POC    Subjective    Patient reports no falls and states no pain today.  Precautions  Precautions  Precautions Comment: Prior foot surgeries    Pain  Pain Assessment: 0-10  0-10 (Numeric) Pain Score: 0 - No pain  Pain Location: Ankle  Pain Orientation: Right    Objective   Physical Therapy Treatment    Patient Name: Manuel Al  MRN: [id number]  Today's Date: 9/6/2024  Time Calculation  Start Time: 1230  Stop Time: 1315  Time Calculation (min): 45 min  PT Therapeutic Procedures Time Entry  Therapeutic Exercise Time Entry: 42       Assessment:   Patient able to tolerate new PRE's with no c/o increased symptoms. Patient challenged with proprioception with ankle sway noted. Patient tolerates progressions of reps.     Plan:  Continue to work on improving strength and decreasing pain to be able to perform normal work duties with little to no difficulty. -AB.     OP PT Plan  Treatment/Interventions: Aquatic therapy, Blood flow restriction therapy, Biofeedback, Canalith repositioning, Cryotherapy, Dry needling, Education/ Instruction, Electrical stimulation, Fluidotherapy, Gait training, Hot pack, Iontophoresis, Laser, Manual therapy, Mechanical traction, Neuromuscular re-education, Orthosis fit/ training, Self care/ home management, Taping techniques, Therapeutic activities, Therapeutic exercises, Ultrasound, Vasopneumatic device  PT Plan: Skilled PT  PT Frequency: 1 time per week  Duration:  "8 weeks  Onset Date: 05/18/24  Rehab Potential: Good  Plan of Care Agreement: Patient    Current Problem  Problem List Items Addressed This Visit             ICD-10-CM       Musculoskeletal and Injuries    Ankle fracture S82.899A       Subjective   General  Reason for Referral: R ankle fx  Referred By: Julita Estrada  General Comment: VISIT 7/9 POC  HEP: Yes  Patient states that he's not really having pain today. States that he thought that his hip felt off when he takes his foot out of the boot.     Precautions  Precautions  Precautions Comment: Prior foot surgeries    Pain  Pain Assessment: 0-10  0-10 (Numeric) Pain Score: 0 - No pain  Pain Location: Ankle  Pain Orientation: Right    Objective     Treatments:  Therapeutic Exercise: 42 minutes, 3 units  Elliptical x5'   Slantboard 2x1'   Step ups 18\" step 2x10 R Fwd/Lateral   Sports cord x5 2 cords Fwd/Bkwd/Lateral   Standing heel raises x15 Bilat   SLS 3x30\" R on airex    Tandem ambulation 16' x2   Fwd lunge R leading 2x10    Squats x10   Small balance board 2x1'  Extreme balance board 2x1'     Not today: 8-23-24  Recumbent bike x6' (X)  SciFit x6' (X)  Standing weight shifts fwd/lat 2x10 ea (X)  Standing hip abd standing Bilat 2 x 10   Seated heel/toe raises 2 x 10 (X)  Seated big toe extension 2x10 R (N)  Seated 4 toe extension 2x10 R (N)  Standing mini squats x15 (N)  Seated Baps board x20 lvl 2 (X)  -DF/PF, IV/EV, CW,CCW   Towel towel crunch 2 x 1'   Seated DF strap stretch x10 10\" hold (X)  Seated 4 way ankle lime band 2 x 10 (X)  ABC trace 1 cycle    OP EDUCATION:   Access Code: [email or web address]  URL: [email or web address]  Date: 08/16/2024  Prepared by: Idalia Blount    Exercises  - Seated Lesser Toes Extension  - 1 x daily - 7 x weekly - 3 sets - 10 reps  - Standing Heel Raises  - 1 x daily - 7 x weekly - 3 sets - 10 reps  - Step Up  - 1 x daily - 7 x weekly - 3 sets - 10 reps  - Seated Great Toe Extension  - 1 x daily - 7 x weekly - 3 sets " - 10 reps     [email or web address]  URL: [email or web address]  Date: 07/25/2024  Prepared by: Chuck Adams     Exercises  - Long Sitting Ankle Eversion with Resistance  - 1 x daily - 7 x weekly - 3 sets - 10 reps  - Long Sitting Ankle Inversion with Resistance  - 1 x daily - 7 x weekly - 3 sets - 10 reps  - Long Sitting Ankle Plantar Flexion with Resistance  - 1 x daily - 7 x weekly - 3 sets - 10 reps  - Long Sitting Ankle Dorsiflexion with Anchored Resistance  - 1 x daily - 7 x weekly - 3 sets - 10 reps  - Long Sitting Calf Stretch with Strap  - 1 x daily - 7 x weekly - 3 sets - 10 reps - 5s hold  - Side to Side Weight Shift with Counter Support  - 1 x daily - 7 x weekly - 3 sets - 10 reps    Goals:  Active       PT Problem       PT R ankle Goals       Start:  07/25/24    Expected End:  10/03/24       1. Pt will adhere to and complete HEP in order to improve functionality outside of the clinic. (2 weeks)    2.   Pt will report 0/10 pain when performing all weight bearing activities in order to improve walking, stairs, running, jumping, and participating in an active lifestyle. (6-8 weeks)    3.   Pt will ambulate with normal gait pattern to improve energy efficiency and decrease fall risk. (6-8 weeks)    4.   Pt will increase strength of R ankle to  5/5 in order to improve weightbearing, walking, ability to complete stairs, running, jumping, and to participate in an active lifestyle.     5.   Pt will improve ROM of R ankle DF to 10 degrees in order to improve gait mechanics, ability to complete stairs, squat, run, and have an active lifestyle. (5-6 weeks)    6.   Pt will complete stairs with reciprocal  pattern  to increase energy efficiency and promote functional mobility.     7.  Pt goal: Regain full ankle function     8. Pt will improve LEFS by 9 points (MCID) to show reduction in disability and improvement in functionality. (6-8 weeks)                  Treatments:    OP Education     Patient  tolerated treatment without pain.  Patient has good form/understanding with there-ex and uses good breath control.  Continue with LE strength/ROM/balance to decrease fall risk.  Assessment  Patient verified by name and .    Plan Continue with LE strength/ROM to improve ambulation, standing, ADL/sports.  Treatment/Interventions: Aquatic therapy, Blood flow restriction therapy, Biofeedback, Canalith repositioning, Cryotherapy, Dry needling, Education/ Instruction, Electrical stimulation, Fluidotherapy, Gait training, Hot pack, Iontophoresis, Laser, Manual therapy, Mechanical traction, Neuromuscular re-education, Orthosis fit/ training, Self care/ home management, Taping techniques, Therapeutic activities, Therapeutic exercises, Ultrasound, Vasopneumatic device  PT Plan: Skilled PT  PT Frequency: 1 time per week  Duration: 8 weeks  Onset Date: 24  Rehab Potential: Good  Plan of Care Agreement: Patient    Active       PT Problem       PT R ankle Goals       Start:  24    Expected End:  10/03/24       1. Pt will adhere to and complete HEP in order to improve functionality outside of the clinic. (2 weeks)    2.   Pt will report 0/10 pain when performing all weight bearing activities in order to improve walking, stairs, running, jumping, and participating in an active lifestyle. (6-8 weeks)    3.   Pt will ambulate with normal gait pattern to improve energy efficiency and decrease fall risk. (6-8 weeks)    4.   Pt will increase strength of R ankle to  5/5 in order to improve weightbearing, walking, ability to complete stairs, running, jumping, and to participate in an active lifestyle.     5.   Pt will improve ROM of R ankle DF to 10 degrees in order to improve gait mechanics, ability to complete stairs, squat, run, and have an active lifestyle. (5-6 weeks)    6.   Pt will complete stairs with reciprocal  pattern  to increase energy efficiency and promote functional mobility.     7.  Pt goal: Regain full  ankle function     8. Pt will improve LEFS by 9 points (MCID) to show reduction in disability and improvement in functionality. (6-8 weeks)

## 2024-09-13 ENCOUNTER — HOSPITAL ENCOUNTER (OUTPATIENT)
Dept: RADIOLOGY | Facility: CLINIC | Age: 24
Discharge: HOME | End: 2024-09-13
Payer: COMMERCIAL

## 2024-09-13 ENCOUNTER — TREATMENT (OUTPATIENT)
Dept: PHYSICAL THERAPY | Facility: CLINIC | Age: 24
End: 2024-09-13
Payer: COMMERCIAL

## 2024-09-13 DIAGNOSIS — M25.571 PAIN IN RIGHT ANKLE AND JOINTS OF RIGHT FOOT: ICD-10-CM

## 2024-09-13 DIAGNOSIS — S82.899A ANKLE FRACTURE: ICD-10-CM

## 2024-09-13 PROCEDURE — 73610 X-RAY EXAM OF ANKLE: CPT | Mod: RT

## 2024-09-13 PROCEDURE — 97110 THERAPEUTIC EXERCISES: CPT | Mod: GP,CQ | Performed by: PHYSICAL THERAPY ASSISTANT

## 2024-09-13 ASSESSMENT — PAIN - FUNCTIONAL ASSESSMENT: PAIN_FUNCTIONAL_ASSESSMENT: 0-10

## 2024-09-13 ASSESSMENT — PAIN SCALES - GENERAL: PAINLEVEL_OUTOF10: 0 - NO PAIN

## 2024-09-13 NOTE — PROGRESS NOTES
"Physical Therapy    Physical Therapy Treatment    Patient Name: Manuel Al  MRN: [id number]  Today's Date: 2024  Time Calculation  Start Time: 1230  Stop Time: 1315  Time Calculation (min): 45 min  PT Therapeutic Procedures Time Entry  Therapeutic Exercise Time Entry: 42                   Current Problem  Problem List Items Addressed This Visit             ICD-10-CM       Musculoskeletal and Injuries    Ankle fracture S82.899A        General  Reason for Referral: R ankle fx  Referred By: Julita Estrada  General Comment: VISIT  POC    Subjective  Patient reports no falls and states no pain. Feeling good.    Precautions  Precautions  Precautions Comment: Prior foot surgeries    Pain  Pain Assessment: 0-10  0-10 (Numeric) Pain Score: 0 - No pain  Pain Location: Ankle  Pain Orientation: Right    Objective     lliptical x5'   Slantboard 2x1'   Step ups 18\" step 2x10 R Fwd/Lateral   Sports cord x5 2 cords Fwd/Bkwd/Lateral   Standing heel raises x15 Bilat   SLS 3x30\" R on airex    Tandem ambulation 16' x2   Fwd lunge R leading 2x10    Squats x10   Small balance board 2x1'  Extreme balance board 2x1'     Treatments:    OP Education      Assessment Patient has good form/understanding with there-ex/program.   Patient uses good breath control with there-ex.  Patient tolerated treatment without pain.  Continue with there-ex to improve strength/ROM to decrease fall risk.  Patient verified by name and .    Plan Continue with ankle strength to improve standing, ambulation, ADL.  Treatment/Interventions: Aquatic therapy, Blood flow restriction therapy, Biofeedback, Canalith repositioning, Cryotherapy, Dry needling, Education/ Instruction, Electrical stimulation, Fluidotherapy, Gait training, Hot pack, Iontophoresis, Laser, Manual therapy, Mechanical traction, Neuromuscular re-education, Orthosis fit/ training, Self care/ home management, Taping techniques, Therapeutic activities, Therapeutic exercises, Ultrasound, " Vasopneumatic device  PT Plan: Skilled PT  PT Frequency: 1 time per week  Duration: 8 weeks  Onset Date: 05/18/24  Rehab Potential: Good  Plan of Care Agreement: Patient    Active       PT Problem       PT R ankle Goals       Start:  07/25/24    Expected End:  10/03/24       1. Pt will adhere to and complete HEP in order to improve functionality outside of the clinic. (2 weeks)    2.   Pt will report 0/10 pain when performing all weight bearing activities in order to improve walking, stairs, running, jumping, and participating in an active lifestyle. (6-8 weeks)    3.   Pt will ambulate with normal gait pattern to improve energy efficiency and decrease fall risk. (6-8 weeks)    4.   Pt will increase strength of R ankle to  5/5 in order to improve weightbearing, walking, ability to complete stairs, running, jumping, and to participate in an active lifestyle.     5.   Pt will improve ROM of R ankle DF to 10 degrees in order to improve gait mechanics, ability to complete stairs, squat, run, and have an active lifestyle. (5-6 weeks)    6.   Pt will complete stairs with reciprocal  pattern  to increase energy efficiency and promote functional mobility.     7.  Pt goal: Regain full ankle function     8. Pt will improve LEFS by 9 points (MCID) to show reduction in disability and improvement in functionality. (6-8 weeks)

## 2024-09-19 ENCOUNTER — TREATMENT (OUTPATIENT)
Dept: PHYSICAL THERAPY | Facility: CLINIC | Age: 24
End: 2024-09-19
Payer: COMMERCIAL

## 2024-09-19 DIAGNOSIS — S82.899A ANKLE FRACTURE: ICD-10-CM

## 2024-09-19 DIAGNOSIS — S82.891S CLOSED FRACTURE OF RIGHT ANKLE, SEQUELA: Primary | ICD-10-CM

## 2024-09-19 PROCEDURE — 97110 THERAPEUTIC EXERCISES: CPT | Mod: GP

## 2024-09-19 ASSESSMENT — PAIN SCALES - GENERAL: PAINLEVEL_OUTOF10: 0 - NO PAIN

## 2024-09-19 ASSESSMENT — PAIN - FUNCTIONAL ASSESSMENT: PAIN_FUNCTIONAL_ASSESSMENT: 0-10

## 2024-09-19 NOTE — PROGRESS NOTES
"Physical Therapy Treatment    Patient Name: Manuel Al  MRN: [id number]  : 2000   Julita Estrada  Today's Date: 2024  Time Calculation  Start Time: 1241  Stop Time: 1306  Time Calculation (min): 25 min  PT Therapeutic Procedures Time Entry  Therapeutic Exercise Time Entry: 25           General  Reason for Referral: R ankle fx  Referred By: Julita Estrada  General Comment: VISIT  POC  Visit #9     Current Problem  Problem List Items Addressed This Visit             ICD-10-CM       Musculoskeletal and Injuries    Ankle fracture - Primary S82.899A            Subjective   Pt reports that he has no pain in ankle coming into today. Pt says that the only time the ankle is irritated is when he is standing during his 12 hour work shifts. Pt has been able to complete HEP without any issues to date.      Pt. Reports compliance with HEP.    Precautions  Precautions  Precautions Comment: Prior foot surgeries    Pain  Pain Assessment: 0-10  0-10 (Numeric) Pain Score: 0 - No pain    Objective   MMT RIGHT LEFT   Hip Flexion     Hip Extension     Hip Abduction     Hip Adduction     Knee Extension     Knee Flexion     Ankle DF 5 5   Ankle PF 5 5   Ankle INV 5 5   Ankle EV 5 5     Lower Extremity ROM: WNL unless documented below:  ROM in Degrees  RIGHT LEFT   Hip Flexion     Hip Extension     Hip Abduction     Hip Adduction     Knee Extension     Knee Flexion      Ankle DF (20) 10 11   Ankle PF (30-50) 52 53   Ankle INV (20) 26 23   Ankle EV (10) 14 12       Gait: No abnormalities        Outcome Measures:  Other Measures  Lower Extremity Funtional Score (LEFS): 74        Treatments:    Therapeutic exercise  Treadmill x5' at 2.0 MPH, (HEP and Tissue extensibility)  Slantboard calf stretch 3x30\"  Recheck         Current HEP:    Access Code: [email or web address]  URL: [email or web address]  Date: 2024  Prepared by: Idalia Blount    Exercises  - Seated Lesser Toes Extension  - 1 x daily - 7 x weekly - " 3 sets - 10 reps  - Standing Heel Raises  - 1 x daily - 7 x weekly - 3 sets - 10 reps  - Step Up  - 1 x daily - 7 x weekly - 3 sets - 10 reps  - Seated Great Toe Extension  - 1 x daily - 7 x weekly - 3 sets - 10 reps     [email or web address]  URL: [email or web address]  Date: 07/25/2024  Prepared by: Chuck Álvarezler     Exercises  - Long Sitting Ankle Eversion with Resistance  - 1 x daily - 7 x weekly - 3 sets - 10 reps  - Long Sitting Ankle Inversion with Resistance  - 1 x daily - 7 x weekly - 3 sets - 10 reps  - Long Sitting Ankle Plantar Flexion with Resistance  - 1 x daily - 7 x weekly - 3 sets - 10 reps  - Long Sitting Ankle Dorsiflexion with Anchored Resistance  - 1 x daily - 7 x weekly - 3 sets - 10 reps  - Long Sitting Calf Stretch with Strap  - 1 x daily - 7 x weekly - 3 sets - 10 reps - 5s hold  - Side to Side Weight Shift with Counter Support  - 1 x daily - 7 x weekly - 3 sets - 10 reps    Assessment:     Pt has made improvements in strength, ROM, gait, pain, and overall function as evidenced by improved outcome measure. Pt has MET all goals at this time. Pt educated on importance of continuing HEP to maintain gains made during therapy and to contact if any questions or concerns arise, Pt verbalizes understanding at this time. This note will serve a discharge note due to accomplishment of goals.       Plan:  OP PT Plan  Treatment/Interventions: Aquatic therapy, Blood flow restriction therapy, Biofeedback, Canalith repositioning, Cryotherapy, Dry needling, Education/ Instruction, Electrical stimulation, Fluidotherapy, Gait training, Hot pack, Iontophoresis, Laser, Manual therapy, Mechanical traction, Neuromuscular re-education, Orthosis fit/ training, Self care/ home management, Taping techniques, Therapeutic activities, Therapeutic exercises, Ultrasound, Vasopneumatic device  PT Plan: Skilled PT  PT Frequency: 1 time per week    Goals:  Active       PT Problem       PT R ankle Goals       Start:   07/25/24    Expected End:  10/03/24       1. Pt will adhere to and complete HEP in order to improve functionality outside of the clinic. (2 weeks) 9/19: Goal Met    2.   Pt will report 0/10 pain when performing all weight bearing activities in order to improve walking, stairs, running, jumping, and participating in an active lifestyle. (6-8 weeks) 9/19: Goal met    3.   Pt will ambulate with normal gait pattern to improve energy efficiency and decrease fall risk. (6-8 weeks) 9/19: Goal met    4.   Pt will increase strength of R ankle to  5/5 in order to improve weightbearing, walking, ability to complete stairs, running, jumping, and to participate in an active lifestyle.  9/19: Goal met    5.   Pt will improve ROM of R ankle DF to 10 degrees in order to improve gait mechanics, ability to complete stairs, squat, run, and have an active lifestyle. (5-6 weeks) 9/19: Goal met    6.   Pt will complete stairs with reciprocal  pattern  to increase energy efficiency and promote functional mobility. 9/19: Goal met    7.  Pt goal: Regain full ankle function     8. Pt will improve LEFS by 9 points (MCID) to show reduction in disability and improvement in functionality. (6-8 weeks) 9/19: goal met, score improved from 55 to 74

## (undated) DEVICE — DRAPE, SHEET, 17 X 23 IN

## (undated) DEVICE — PADDING, CAST, SOFTROLL, 4 IN X 4 YD, STERILE

## (undated) DEVICE — DRESSING, ADAPTIC, NON-ADHERENT, 3 X 8 IN

## (undated) DEVICE — DRAPE, C-ARM, OEC 9800, W/CLIP, FOOTSWITCH/ 2 DOME

## (undated) DEVICE — SYRINGE, 10 ML, 21 G X 1 0.5 IN, LUER LOK

## (undated) DEVICE — BANDAGE, GAUZE, CONFORMING, KERLIX, 6 PLY, 4.5 IN X 4.1 YD

## (undated) DEVICE — TOWEL, OR, XRAY DECTECTABLE, 17 X 27, BLUE, STERILE

## (undated) DEVICE — CIRCUIT, BREATHING, ADULT, B/V FILTER, HMEF, 3 L BAG, 108 IN

## (undated) DEVICE — BANDAGE, ELASTIC, PREMIUM, SELF-CLOSE, 4 IN X 5.5 YD, STERILE

## (undated) DEVICE — SPONGE, LAP, XRAY DECT, 18IN X 18IN, W/MASTER DMT, STERILE

## (undated) DEVICE — PADDING, CAST, SOFTROLL, 6 IN X 4 YD, STERILE

## (undated) DEVICE — SUTURE, ETHILON, 3-0, 30 IN, FS-1, BLACK

## (undated) DEVICE — BANDAGE, ELASTIC, MATRIX, SELF-CLOSURE, 6 IN X 5 YD, LF

## (undated) DEVICE — Device

## (undated) DEVICE — CUFF, TOURNIQUET, 34 X 4, DUAL PORT/SNGL BLADDER, DISP, LF

## (undated) DEVICE — SUTURE, VICRYL, 3-0, 27 IN, SH

## (undated) DEVICE — TIP, SUCTION, YANKAUER, BULB, ADULT

## (undated) DEVICE — SYRINGE, 60 CC, IRRIGATION, BULB, CONTRO-BULB, PAPER POUCH

## (undated) DEVICE — SUTURE, VICRYL, 2-0, 27 IN, X-1, UNDYED

## (undated) DEVICE — SOLUTION, IRRIGATION, SODIUM CHLORIDE 0.9%, 1000 ML, POUR BOTTLE

## (undated) DEVICE — STRAP, KNEE AND BODY, SINGLE USE

## (undated) DEVICE — BANDAGE, ESMARK 4 IN X 9 FT, STERILE

## (undated) DEVICE — GLOVE, PROTEXIS PI CLASSIC, SZ-6.0, PF, LF

## (undated) DEVICE — DRESSING, GAUZE, SPONGE, 12 PLY, CURITY, 4 X 4 IN, RIGID TRAY, STERILE

## (undated) DEVICE — GLOVE, SURGICAL, PROTEXIS PI BLUE W/NEUTHERA, 6.5, PF, LF

## (undated) DEVICE — STRAP, ARMBOARD, 3IN, BLUE/WHITE, SECURE HOOK